# Patient Record
Sex: FEMALE | Race: WHITE | URBAN - METROPOLITAN AREA
[De-identification: names, ages, dates, MRNs, and addresses within clinical notes are randomized per-mention and may not be internally consistent; named-entity substitution may affect disease eponyms.]

---

## 2017-01-18 ENCOUNTER — RADIANT APPOINTMENT (OUTPATIENT)
Dept: MAMMOGRAPHY | Facility: CLINIC | Age: 62
End: 2017-01-18

## 2017-01-18 DIAGNOSIS — Z12.31 VISIT FOR SCREENING MAMMOGRAM: ICD-10-CM

## 2017-01-20 ENCOUNTER — TELEPHONE (OUTPATIENT)
Dept: OPHTHALMOLOGY | Facility: CLINIC | Age: 62
End: 2017-01-20

## 2017-01-20 NOTE — TELEPHONE ENCOUNTER
Pt states using trial contact lenses and only needs glasses Rx at this time  Pt active on MyChart.  Reviewed able to print from there  Pt pleased with information  Jeremy Marcano RN 2:10 PM 01/20/2017

## 2017-01-20 NOTE — TELEPHONE ENCOUNTER
----- Message from Roslyn Galarza sent at 1/20/2017  2:02 PM CST -----  Regarding: Pt calling asking for eye Prescription to be emailed to pt email on file  Contact: [phone]  Pt calling and lost prescription of glasses and contacts. Pt is asking for a copy of the script to be emailed to her e-mail on file. Pt can be reached with any questions    Pt can be reached at [phone]  Pt e-mail verified by Pt is [email or web address]     Thank You,  Roslyn    Please DO NOT send this message and/or reply back to sender.  Call Center Representatives DO NOT respond to messages.

## 2017-02-03 ENCOUNTER — TELEPHONE (OUTPATIENT)
Dept: OPTOMETRY | Facility: CLINIC | Age: 62
End: 2017-02-03

## 2017-02-03 ENCOUNTER — OFFICE VISIT (OUTPATIENT)
Dept: OPTOMETRY | Facility: CLINIC | Age: 62
End: 2017-02-03

## 2017-02-03 DIAGNOSIS — H52.4 HYPEROPIA WITH PRESBYOPIA OF BOTH EYES: Primary | ICD-10-CM

## 2017-02-03 DIAGNOSIS — H52.03 HYPEROPIA WITH PRESBYOPIA OF BOTH EYES: Primary | ICD-10-CM

## 2017-02-03 NOTE — PROGRESS NOTES
No office visit. CL order only.    Contact Lens Billing  V-Code:  - Soft Bifocal   Final Contact Lens Rx      Brand Base Curve Diameter Sphere Cylinder Axis Add Addl. Specs   Right Proclear Multifocal 8.7 14.4 +3.25   +2.50 D   Left Proclear Multifocal Toric 8.8 14.4 +3.75 -0.75 160 +2.50 N            # of units: 1 box each eye  Price per Unit: $78 right eye, $155 left eye    These are for cosmetic contact lenses.    Encounter Diagnosis   Name Primary?     Hyperopia with presbyopia of both eyes Yes        Date of last eye exam: 12/19/16

## 2017-03-07 ASSESSMENT — ANXIETY QUESTIONNAIRES
GAD7 TOTAL SCORE: 0
7. FEELING AFRAID AS IF SOMETHING AWFUL MIGHT HAPPEN: 0 = NOT AT ALL
GAD7 TOTAL SCORE: 0

## 2017-03-07 ASSESSMENT — ENCOUNTER SYMPTOMS
BRUISES/BLEEDS EASILY: 0
SWOLLEN GLANDS: 0

## 2017-03-08 ENCOUNTER — OFFICE VISIT (OUTPATIENT)
Dept: OBGYN | Facility: CLINIC | Age: 62
End: 2017-03-08
Attending: OBSTETRICS & GYNECOLOGY
Payer: COMMERCIAL

## 2017-03-08 VITALS
WEIGHT: 169.5 LBS | SYSTOLIC BLOOD PRESSURE: 122 MMHG | HEART RATE: 82 BPM | HEIGHT: 64 IN | DIASTOLIC BLOOD PRESSURE: 81 MMHG | BODY MASS INDEX: 28.94 KG/M2

## 2017-03-08 DIAGNOSIS — Z01.419 ENCOUNTER FOR GYNECOLOGICAL EXAMINATION WITHOUT ABNORMAL FINDING: ICD-10-CM

## 2017-03-08 DIAGNOSIS — B96.89 BV (BACTERIAL VAGINOSIS): Primary | ICD-10-CM

## 2017-03-08 DIAGNOSIS — N76.0 BV (BACTERIAL VAGINOSIS): Primary | ICD-10-CM

## 2017-03-08 DIAGNOSIS — E03.9 ACQUIRED HYPOTHYROIDISM: ICD-10-CM

## 2017-03-08 LAB
CLUE CELLS: NORMAL
TRICHOMONAS (WET PREP): NORMAL
YEAST (WET PREP): NORMAL

## 2017-03-08 PROCEDURE — 99212 OFFICE O/P EST SF 10 MIN: CPT | Mod: ZF

## 2017-03-08 PROCEDURE — 87210 SMEAR WET MOUNT SALINE/INK: CPT | Mod: ZF | Performed by: OBSTETRICS & GYNECOLOGY

## 2017-03-08 RX ORDER — METRONIDAZOLE 500 MG/1
500 TABLET ORAL 2 TIMES DAILY
Qty: 14 TABLET | Refills: 1 | Status: SHIPPED | OUTPATIENT
Start: 2017-03-08 | End: 2018-11-12

## 2017-03-08 RX ORDER — LEVOTHYROXINE SODIUM 75 UG/1
75 TABLET ORAL DAILY
Qty: 90 TABLET | Refills: 3 | Status: SHIPPED | OUTPATIENT
Start: 2017-03-08 | End: 2018-05-02

## 2017-03-08 ASSESSMENT — ANXIETY QUESTIONNAIRES: GAD7 TOTAL SCORE: 0

## 2017-03-08 NOTE — MR AVS SNAPSHOT
After Visit Summary   3/8/2017    Jill Fofana    MRN: [id number]           Patient Information     Date Of Birth          1955        Visit Information        Provider Department      3/8/2017 11:00 AM Roslyn Anderson MD [facility]        Today's Diagnoses     BV (bacterial vaginosis)    -  1    Acquired hypothyroidism        Encounter for gynecological examination without abnormal finding           Follow-ups after your visit        Follow-up notes from your care team     Return in 1 year (on 3/8/2018) for Preventative Health Visit.      Who to contact     Please call your clinic at [phone] to:    Ask questions about your health    Make or cancel appointments    Discuss your medicines    Learn about your test results    Speak to your doctor   If you have compliments or concerns about an experience at your clinic, or if you wish to file a complaint, please contact [facility] Patient Relations at [phone] or email us at [email or web address]         Additional Information About Your Visit        MyChart Information     IntroMapst gives you secure access to your electronic health record. If you see a primary care provider, you can also send messages to your care team and make appointments. If you have questions, please call your primary care clinic.  If you do not have a primary care provider, please call [phone] and they will assist you.      Panasas is an electronic gateway that provides easy, online access to your medical records. With Panasas, you can request a clinic appointment, read your test results, renew a prescription or communicate with your care team.     To access your existing account, please contact your [facility] or call [phone] for assistance.        Care EveryWhere ID     This is your Care EveryWhere ID. This could be used by other organizations to access  "your [place] medical records  [id number]        Your Vitals Were     Pulse Height BMI (Body Mass Index)             82 1.626 m (5' 4\") 29.09 kg/m2          Blood Pressure from Last 3 Encounters:   03/08/17 122/81   03/04/16 106/72   11/06/14 138/77    Weight from Last 3 Encounters:   03/08/17 76.9 kg (169 lb 8 oz)   03/04/16 72.6 kg (160 lb)   11/06/14 78.3 kg (172 lb 9.6 oz)              We Performed the Following     Pelvic and Breast Exam Procedure []     Wet Prep POCT          Today's Medication Changes          These changes are accurate as of: 3/8/17 11:59 PM.  If you have any questions, ask your nurse or doctor.               Start taking these medicines.        Dose/Directions    metroNIDAZOLE 500 MG tablet   Commonly known as:  FLAGYL   Used for:  BV (bacterial vaginosis)   Started by:  Roslyn Anderson MD        Dose:  500 mg   Take 1 tablet (500 mg) by mouth 2 times daily   Quantity:  14 tablet   Refills:  1         Stop taking these medicines if you haven't already. Please contact your care team if you have questions.     vitamin D 73804 UNIT capsule   Commonly known as:  ERGOCALCIFEROL   Stopped by:  Roslyn Anderson MD                Where to get your medicines      These medications were sent to [place] [address] [place] [address] [address]     Phone:  [phone]     levothyroxine 75 MCG tablet    metroNIDAZOLE 500 MG tablet                Primary Care Provider Office Phone # Fax #    Roslyn Anderson [phone]       [facility] [address] [address]        Thank you!     Thank you for choosing [facility]  for your care. Our goal is always to provide you with excellent care. Hearing back from our patients is one way we can continue to improve our services. Please take a few minutes to complete the written survey that you may receive in the mail " after your visit with us. Thank you!             Your Updated Medication List - Protect others around you: Learn how to safely use, store and throw away your medicines at www.disposemymeds.org.          This list is accurate as of: 3/8/17 11:59 PM.  Always use your most recent med list.                   Brand Name Dispense Instructions for use    albuterol 108 (90 BASE) MCG/ACT Inhaler    PROAIR HFA/PROVENTIL HFA/VENTOLIN HFA    3 Inhaler    Inhale 2 puffs into the lungs every 6 hours as needed for shortness of breath / dyspnea       erythromycin ophthalmic ointment    ROMYCIN    1 Tube    Place 0.25 inches into both eyes At Bedtime Place 0.25 inch ribbon near corners of both eyes at bedtime.       IRON CR PO      Take  by mouth. 1 tab daily ? dose       levothyroxine 75 MCG tablet    SYNTHROID/LEVOTHROID    90 tablet    Take 1 tablet (75 mcg) by mouth daily       metroNIDAZOLE 500 MG tablet    FLAGYL    14 tablet    Take 1 tablet (500 mg) by mouth 2 times daily       MULTIVITAMIN PO      Take  by mouth.       VITAMIN D (CHOLECALCIFEROL) PO      Take by mouth daily

## 2017-03-08 NOTE — LETTER
3/8/2017       RE: Jill Fofana  [address] [facility] [address]     Dear Colleague,    Thank you for referring your patient, Jill Fofana, to the [facility] at [facility]. Please see a copy of my visit note below.      Progress Note    SUBJECTIVE:  Jill Fofana is an 61 year old  , who requests a breast and pelvic exam.      Concerns today include: none    Menstrual History:  Menstrual History 11/15/2013   Reviewed Today Yes   Comments Menopause       Last    Lab Results   Component Value Date    PAP NIL 11/15/2013     History of abnormal Pap smear: NO - age 30-65 PAP every 5 years with negative HPV co-testing recommended    Last No results found for: HPV16  Last No results found for: HPV18  Last No results found for: HRHPV    Mammogram current: yes    HISTORY:  Prescription Medications as of 3/10/2017             VITAMIN D, CHOLECALCIFEROL, PO Take by mouth daily    levothyroxine (SYNTHROID/LEVOTHROID) 75 MCG tablet Take 1 tablet (75 mcg) by mouth daily    metroNIDAZOLE (FLAGYL) 500 MG tablet Take 1 tablet (500 mg) by mouth 2 times daily    erythromycin (ROMYCIN) ophthalmic ointment Place 0.25 inches into both eyes At Bedtime Place 0.25 inch ribbon near corners of both eyes at bedtime.    albuterol (PROAIR HFA, PROVENTIL HFA, VENTOLIN HFA) 108 (90 BASE) MCG/ACT inhaler Inhale 2 puffs into the lungs every 6 hours as needed for shortness of breath / dyspnea    Multiple Vitamin (MULTIVITAMIN OR) Take  by mouth.    Ferrous Sulfate (IRON CR PO) Take  by mouth. 1 tab daily ? dose        No Known Allergies  Immunization History   Administered Date(s) Administered     TDAP (BOOSTRIX AGES 10-64) 2013       Obstetric History       T0      TAB0   SAB0   E0   M0   L2      Past Medical History   Diagnosis Date     Nonsenile cataract      Past Surgical History   Procedure Laterality Date      "Thyroidectomy       Strip vein       Appendectomy       Family History   Problem Relation Age of Onset     Nystagmus No family hx of      CANCER Father      Social History     Social History     Marital status:      Spouse name: N/A     Number of children: N/A     Years of education: N/A     Occupational History      [employer]     Social History Main Topics     Smoking status: Former Smoker     Smokeless tobacco: Never Used     Alcohol use 0.5 - 1.0 oz/week     1 - 2 Standard drinks or equivalent per week      Comment: occ.     Drug use: No     Sexual activity: No     Other Topics Concern      Service No     Blood Transfusions No     Caffeine Concern No     Occupational Exposure Yes     travel     Hobby Hazards No     Sleep Concern No     Stress Concern No     Weight Concern No     Special Diet No     Back Care No     Exercise Yes     Bike Helmet Yes     Seat Belt Yes     Self-Exams Yes     Social History Narrative    How much exercise per week? 6 days a week    How much calcium per day? Supplement, Diet       How much caffeine per day? Tea (2 cup)    How much vitamin D per day? Supplement, Diet    Do you/your family wear seatbelts?  Yes    Do you/your family use safety helmets? No    Do you/your family use sunscreen? Yes    Do you/your family keep firearms in the home? No    Do you/your family have a smoke detector(s)? Yes        Do you feel safe in your home? Yes    Has anyone ever touched you in an unwanted manner? 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     Lots of leadership changes, working very hard.     Both children graduate this year.     Roslyn Anderson MD                           ROS    EXAM:  Blood pressure 122/81, pulse 82, height 1.626 m (5' 4\"), weight 76.9 kg (169 lb 8 oz). Body mass index is 29.09 kg/(m^2).  General appearance: Pleasant female in no acute distress.     BREAST EXAM:  Breast: Without visible skin changes. No dimpling or " lesions seen.   Breasts supple, non-tender with palpation, no dominant mass, nodularity, or nipple discharge noted bilaterally. Axillary nodes negative.      PELVIC EXAM:  EG/BUS: Normal genital architecture without lesions, erythema or abnormal secretions Bartholin's, Urethra, [place]'s normal   Urethral meatus: normal   Urethra: no masses, tenderness, or scarring   Bladder: no masses or tenderness   Vagina:  with creamy, white and odorless  secretions  Cervix: Nulliparous, and no lesions  Uterus: anteverted,  and small, smooth, firm, mobile w/o pain  Adnexa: Within normal limits and No masses, nodularity, tenderness  Rectum:anus normal       ASSESSMENT:  Encounter Diagnoses   Name Primary?     Acquired hypothyroidism      BV (bacterial vaginosis) Yes      61 year old Female Pelvic and Breast Exam  BV    PLAN:   Orders Placed This Encounter   Procedures     Wet Prep POCT     metronidazole  Return in one year/PRN for preventive care or problems/concerns.     Verbalized understanding and agreement with visit plan.

## 2017-03-10 NOTE — PROGRESS NOTES
Progress Note    SUBJECTIVE:  Jill Fofana is an 61 year old  , who requests a breast and pelvic exam.      Concerns today include: none    Menstrual History:  Menstrual History 11/15/2013   Reviewed Today Yes   Comments Menopause       Last    Lab Results   Component Value Date    PAP NIL 11/15/2013     History of abnormal Pap smear: NO - age 30-65 PAP every 5 years with negative HPV co-testing recommended    Last No results found for: HPV16  Last No results found for: HPV18  Last No results found for: HRHPV    Mammogram current: yes    HISTORY:  Prescription Medications as of 3/10/2017             VITAMIN D, CHOLECALCIFEROL, PO Take by mouth daily    levothyroxine (SYNTHROID/LEVOTHROID) 75 MCG tablet Take 1 tablet (75 mcg) by mouth daily    metroNIDAZOLE (FLAGYL) 500 MG tablet Take 1 tablet (500 mg) by mouth 2 times daily    erythromycin (ROMYCIN) ophthalmic ointment Place 0.25 inches into both eyes At Bedtime Place 0.25 inch ribbon near corners of both eyes at bedtime.    albuterol (PROAIR HFA, PROVENTIL HFA, VENTOLIN HFA) 108 (90 BASE) MCG/ACT inhaler Inhale 2 puffs into the lungs every 6 hours as needed for shortness of breath / dyspnea    Multiple Vitamin (MULTIVITAMIN OR) Take  by mouth.    Ferrous Sulfate (IRON CR PO) Take  by mouth. 1 tab daily ? dose        No Known Allergies  Immunization History   Administered Date(s) Administered     TDAP (BOOSTRIX AGES 10-64) 2013       Obstetric History       T0      TAB0   SAB0   E0   M0   L2      Past Medical History   Diagnosis Date     Nonsenile cataract      Past Surgical History   Procedure Laterality Date     Thyroidectomy       Strip vein       Appendectomy       Family History   Problem Relation Age of Onset     Nystagmus No family hx of      CANCER Father      Social History     Social History     Marital status:      Spouse name: N/A     Number of children: N/A     Years of education: N/A     Occupational History  "     [employer]     Social History Main Topics     Smoking status: Former Smoker     Smokeless tobacco: Never Used     Alcohol use 0.5 - 1.0 oz/week     1 - 2 Standard drinks or equivalent per week      Comment: occ.     Drug use: No     Sexual activity: No     Other Topics Concern      Service No     Blood Transfusions No     Caffeine Concern No     Occupational Exposure Yes     travel     Hobby Hazards No     Sleep Concern No     Stress Concern No     Weight Concern No     Special Diet No     Back Care No     Exercise Yes     Bike Helmet Yes     Seat Belt Yes     Self-Exams Yes     Social History Narrative    How much exercise per week? 6 days a week    How much calcium per day? Supplement, Diet       How much caffeine per day? Tea (2 cup)    How much vitamin D per day? Supplement, Diet    Do you/your family wear seatbelts?  Yes    Do you/your family use safety helmets? No    Do you/your family use sunscreen? Yes    Do you/your family keep firearms in the home? No    Do you/your family have a smoke detector(s)? Yes        Do you feel safe in your home? Yes    Has anyone ever touched you in an unwanted manner? 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     Both children graduate this year.     Roslyn Anderson MD                           ROS    EXAM:  Blood pressure 122/81, pulse 82, height 1.626 m (5' 4\"), weight 76.9 kg (169 lb 8 oz). Body mass index is 29.09 kg/(m^2).  General appearance: Pleasant female in no acute distress.     BREAST EXAM:  Breast: Without visible skin changes. No dimpling or lesions seen.   Breasts supple, non-tender with palpation, no dominant mass, nodularity, or nipple discharge noted bilaterally. Axillary nodes negative.      PELVIC EXAM:  EG/BUS: Normal genital architecture without lesions, erythema or abnormal secretions Bartholin's, Urethra, [place]'s normal   Urethral meatus: normal "   Urethra: no masses, tenderness, or scarring   Bladder: no masses or tenderness   Vagina:  with creamy, white and odorless  secretions  Cervix: Nulliparous, and no lesions  Uterus: anteverted,  and small, smooth, firm, mobile w/o pain  Adnexa: Within normal limits and No masses, nodularity, tenderness  Rectum:anus normal       ASSESSMENT:  Encounter Diagnoses   Name Primary?     Acquired hypothyroidism      BV (bacterial vaginosis) Yes      61 year old Female Pelvic and Breast Exam  BV    PLAN:   Orders Placed This Encounter   Procedures     Wet Prep POCT     metronidazole  Return in one year/PRN for preventive care or problems/concerns.     Verbalized understanding and agreement with visit plan.  Answers for HPI/ROS submitted by the patient on 3/7/2017   SUSANA 7 TOTAL SCORE: 0  Q1: Little interest or pleasure in doing things: 0=Not at all  Q2: Feeling down, depressed or hopeless: 0=Not at all  PHQ-2 Score: 0  General Symptoms: No  Skin Symptoms: No  HENT Symptoms: No  EYE SYMPTOMS: No  HEART SYMPTOMS: No  LUNG SYMPTOMS: No  INTESTINAL SYMPTOMS: No  URINARY SYMPTOMS: No  GYNECOLOGIC SYMPTOMS: No  BREAST SYMPTOMS: No  SKELETAL SYMPTOMS: No  BLOOD SYMPTOMS: Yes  NERVOUS SYSTEM SYMPTOMS: No  MENTAL HEALTH SYMPTOMS: No  Anemia: Yes  Swollen glands: No  Easy bleeding or bruising: No  Edema or swelling: No

## 2017-08-12 ENCOUNTER — HEALTH MAINTENANCE LETTER (OUTPATIENT)
Age: 62
End: 2017-08-12

## 2017-12-20 ENCOUNTER — OFFICE VISIT (OUTPATIENT)
Dept: OPTOMETRY | Facility: CLINIC | Age: 62
End: 2017-12-20
Payer: COMMERCIAL

## 2017-12-20 DIAGNOSIS — H52.203 HYPEROPIA WITH ASTIGMATISM AND PRESBYOPIA, BILATERAL: Primary | ICD-10-CM

## 2017-12-20 DIAGNOSIS — H52.03 HYPEROPIA WITH ASTIGMATISM AND PRESBYOPIA, BILATERAL: Primary | ICD-10-CM

## 2017-12-20 DIAGNOSIS — H52.4 HYPEROPIA WITH ASTIGMATISM AND PRESBYOPIA, BILATERAL: Primary | ICD-10-CM

## 2017-12-20 ASSESSMENT — REFRACTION_WEARINGRX
OS_ADD: +2.50
OS_AXIS: 070
OS_SPHERE: +2.75
OS_CYLINDER: +0.50
OD_AXIS: 140
OD_SPHERE: +2.50
OD_ADD: +2.50
OD_CYLINDER: +0.75

## 2017-12-20 ASSESSMENT — REFRACTION_MANIFEST
OS_SPHERE: +2.50
OS_CYLINDER: +1.00
OD_AXIS: 110
OD_SPHERE: +2.50
OS_AXIS: 075
OD_CYLINDER: +0.50

## 2017-12-20 ASSESSMENT — REFRACTION_CURRENTRX
OS_ADDL_SPECS: N
OS_SPHERE: +3.75
OS_CYLINDER: -0.75
OS_AXIS: 160
OD_BASECURVE: 8.7
OS_BASECURVE: 8.8
OD_ADDL_SPECS: D
OD_ADD: +2.50
OD_BRAND: PROCLEAR MULTIFOCAL
OS_ADD: +2.50
OD_SPHERE: +3.25
OD_DIAMETER: 14.4
OS_BRAND: PROCLEAR MULTIFOCAL TORIC
OS_DIAMETER: 14.4

## 2017-12-20 ASSESSMENT — TONOMETRY
OS_IOP_MMHG: 14
OS_IOP_MMHG: 14
OD_IOP_MMHG: 16
OD_IOP_MMHG: 16
IOP_METHOD: ICARE
IOP_METHOD: ICARE

## 2017-12-20 ASSESSMENT — VISUAL ACUITY
METHOD: SNELLEN - LINEAR
OD_CC: 20/20
CORRECTION_TYPE: CONTACTS
OS_CC: 20/40

## 2017-12-20 ASSESSMENT — CUP TO DISC RATIO
OS_RATIO: 0.30
OD_RATIO: 0.30

## 2017-12-20 ASSESSMENT — SLIT LAMP EXAM - LIDS: COMMENTS: 1+ MGD

## 2017-12-20 ASSESSMENT — CONF VISUAL FIELD
OS_NORMAL: 1
OD_NORMAL: 1

## 2017-12-20 ASSESSMENT — EXTERNAL EXAM - RIGHT EYE: OD_EXAM: NORMAL

## 2017-12-20 ASSESSMENT — EXTERNAL EXAM - LEFT EYE: OS_EXAM: NORMAL

## 2017-12-20 NOTE — PROGRESS NOTES
A/P  1.) Hyperopia/Presbyopia OU  -Doing well in Proclear MF lenses, no update needed today  -Updated spec Rx given, minor changes only    2.) Anatomical narrow angle OU  -Monitor, reviewed with pt    Monitor 1-2 years routine, sooner prn    Contact Lens Billing  V-Code:  - Soft Bifocal  Final Contact Lens Rx      Brand Base Curve Diameter Sphere Cylinder Axis Add Addl. Specs   Right Proclear Multifocal 8.7 14.4 +3.25   +2.50 D   Left Proclear Multifocal Toric 8.8 14.4 +3.75 -0.75 160 +2.50 N       Expiration Date:  12/20/19    Replacement:  Monthly    Wearing Schedule:  Daily wear only         # of units: 1 box right eye, 2 boxes left eye  Price per Unit: $78 right eye, $155 left eye    These are for cosmetic contact lenses.    Encounter Diagnosis   Name Primary?     Hyperopia with astigmatism and presbyopia, bilateral Yes

## 2017-12-20 NOTE — MR AVS SNAPSHOT
After Visit Summary   12/20/2017    Jill Fofana    MRN: [id number]           Patient Information     Date Of Birth          1955        Visit Information        Provider Department      12/20/2017 2:30 PM Joann Dave, [facility]        Today's Diagnoses     Hyperopia with astigmatism and presbyopia, bilateral    -  1       Follow-ups after your visit        Who to contact     Please call your clinic at [phone] to:    Ask questions about your health    Make or cancel appointments    Discuss your medicines    Learn about your test results    Speak to your doctor   If you have compliments or concerns about an experience at your clinic, or if you wish to file a complaint, please contact [facility] Patient Relations at [phone] or email us at [email or web address]         Additional Information About Your Visit        MyChart Information     Treeveot gives you secure access to your electronic health record. If you see a primary care provider, you can also send messages to your care team and make appointments. If you have questions, please call your primary care clinic.  If you do not have a primary care provider, please call [phone] and they will assist you.      Ticketmaster is an electronic gateway that provides easy, online access to your medical records. With Ticketmaster, you can request a clinic appointment, read your test results, renew a prescription or communicate with your care team.     To access your existing account, please contact your [facility] or call [phone] for assistance.        Care EveryWhere ID     This is your Care EveryWhere ID. This could be used by other organizations to access your [place] medical records  [id number]         Blood Pressure from Last 3 Encounters:   03/08/17 122/81   03/04/16 106/72   11/06/14 138/77    Weight from Last 3 Encounters:   03/08/17 76.9 kg  (169 lb 8 oz)   03/04/16 72.6 kg (160 lb)   11/06/14 78.3 kg (172 lb 9.6 oz)              We Performed the Following     REFRACTION [[id number]]        Primary Care Provider Office Phone # Fax #    Roslynteja Anderson [phone]       [address]  [facility] [address]        Equal Access to Services     ANA PAULA TRUONG : Hadii aad ku hadasho Soomaali, waaxda luqadaha, qaybta kaalmada adeegyada, waxay idiin hayaan adeeg kharash la'aan ah. So [facility] [phone].    ATENCIÓN: Si habla espsoraya, tiene a monzon disposición servicios gratuitos de asistencia lingüística. Shubhamame al [phone].    We comply with applicable federal civil rights laws and [place] laws. We do not discriminate on the basis of race, color, national origin, age, disability, sex, sexual orientation, or gender identity.            Thank you!     Thank you for choosing [facility]  for your care. Our goal is always to provide you with excellent care. Hearing back from our patients is one way we can continue to improve our services. Please take a few minutes to complete the written survey that you may receive in the mail after your visit with us. Thank you!             Your Updated Medication List - Protect others around you: Learn how to safely use, store and throw away your medicines at www.disposemymeds.org.          This list is accurate as of: 12/20/17  3:44 PM.  Always use your most recent med list.                   Brand Name Dispense Instructions for use Diagnosis    albuterol 108 (90 BASE) MCG/ACT Inhaler    PROAIR HFA/PROVENTIL HFA/VENTOLIN HFA    3 Inhaler    Inhale 2 puffs into the lungs every 6 hours as needed for shortness of breath / dyspnea    Mild intermittent asthma without complication       erythromycin ophthalmic ointment    ROMYCIN    1 Tube    Place 0.25 inches into both eyes At Bedtime Place 0.25 inch ribbon near corners of both eyes at bedtime.    Dry eyes, bilateral       IRON CR PO      Take  by mouth. 1 tab  daily ? dose        levothyroxine 75 MCG tablet    SYNTHROID/LEVOTHROID    90 tablet    Take 1 tablet (75 mcg) by mouth daily    Acquired hypothyroidism       metroNIDAZOLE 500 MG tablet    FLAGYL    14 tablet    Take 1 tablet (500 mg) by mouth 2 times daily    BV (bacterial vaginosis)       MULTIVITAMIN PO      Take  by mouth.        VITAMIN D (CHOLECALCIFEROL) PO      Take by mouth daily

## 2018-02-08 ENCOUNTER — RADIANT APPOINTMENT (OUTPATIENT)
Dept: MAMMOGRAPHY | Facility: CLINIC | Age: 63
End: 2018-02-08
Attending: OBSTETRICS & GYNECOLOGY
Payer: COMMERCIAL

## 2018-02-08 DIAGNOSIS — Z12.31 VISIT FOR SCREENING MAMMOGRAM: ICD-10-CM

## 2018-05-02 ENCOUNTER — TELEPHONE (OUTPATIENT)
Dept: OBGYN | Facility: CLINIC | Age: 63
End: 2018-05-02

## 2018-05-02 DIAGNOSIS — E03.9 ACQUIRED HYPOTHYROIDISM: ICD-10-CM

## 2018-05-02 RX ORDER — LEVOTHYROXINE SODIUM 75 UG/1
75 TABLET ORAL DAILY
Qty: 90 TABLET | Refills: 0 | Status: SHIPPED | OUTPATIENT
Start: 2018-05-02 | End: 2018-07-12

## 2018-05-02 NOTE — TELEPHONE ENCOUNTER
----- Message from Delisa Abril sent at 5/2/2018  8:50 AM CDT -----  Regarding: call back/ lab order request, pt of Dr. Anderson  Contact: [phone]  Pt requesting order for lab work so pt can get a new script of Thyroid medication, pt stated she is currently out of the medication. Pt can be reached at [phone], and it is ok to leave a detailed message. Thanks.    [facility]  Call Center    Please DO NOT send this message and/or reply back to sender.  Call Center Representatives DO NOT respond to messages.

## 2018-05-02 NOTE — TELEPHONE ENCOUNTER
Spoke to Jill and she is ok with us sending a levothyroxine refill to cover until seen in clinic as scheduled 7/2018 with Dr Anderson. She doesn't have any s/s in which she thinks her dose may need to be modified.Pt indicated understanding and agreed with plan.

## 2018-07-05 ASSESSMENT — ENCOUNTER SYMPTOMS
POSTURAL DYSPNEA: 0
SORE THROAT: 0
NECK MASS: 0
SINUS CONGESTION: 0
COUGH DISTURBING SLEEP: 0
SMELL DISTURBANCE: 0
SHORTNESS OF BREATH: 0
DYSPNEA ON EXERTION: 0
SINUS PAIN: 1
WHEEZING: 0
HOARSE VOICE: 0
COUGH: 0
TASTE DISTURBANCE: 0
TROUBLE SWALLOWING: 0
SPUTUM PRODUCTION: 0
HEMOPTYSIS: 0
SNORES LOUDLY: 0

## 2018-07-05 ASSESSMENT — ANXIETY QUESTIONNAIRES
7. FEELING AFRAID AS IF SOMETHING AWFUL MIGHT HAPPEN: NOT AT ALL
6. BECOMING EASILY ANNOYED OR IRRITABLE: NOT AT ALL
1. FEELING NERVOUS, ANXIOUS, OR ON EDGE: NOT AT ALL
4. TROUBLE RELAXING: NOT AT ALL
2. NOT BEING ABLE TO STOP OR CONTROL WORRYING: NOT AT ALL
5. BEING SO RESTLESS THAT IT IS HARD TO SIT STILL: NOT AT ALL
GAD7 TOTAL SCORE: 0
7. FEELING AFRAID AS IF SOMETHING AWFUL MIGHT HAPPEN: NOT AT ALL
3. WORRYING TOO MUCH ABOUT DIFFERENT THINGS: NOT AT ALL
GAD7 TOTAL SCORE: 0

## 2018-07-06 ENCOUNTER — APPOINTMENT (OUTPATIENT)
Dept: LAB | Facility: CLINIC | Age: 63
End: 2018-07-06
Payer: COMMERCIAL

## 2018-07-06 ENCOUNTER — OFFICE VISIT (OUTPATIENT)
Dept: OBGYN | Facility: CLINIC | Age: 63
End: 2018-07-06
Attending: OBSTETRICS & GYNECOLOGY
Payer: COMMERCIAL

## 2018-07-06 VITALS
WEIGHT: 170.4 LBS | HEIGHT: 64 IN | BODY MASS INDEX: 29.09 KG/M2 | SYSTOLIC BLOOD PRESSURE: 129 MMHG | HEART RATE: 92 BPM | DIASTOLIC BLOOD PRESSURE: 89 MMHG

## 2018-07-06 DIAGNOSIS — Z12.4 SCREENING FOR MALIGNANT NEOPLASM OF CERVIX: Primary | ICD-10-CM

## 2018-07-06 DIAGNOSIS — N90.5 ATROPHY, VULVA: ICD-10-CM

## 2018-07-06 DIAGNOSIS — J45.20 MILD INTERMITTENT ASTHMA WITHOUT COMPLICATION: ICD-10-CM

## 2018-07-06 DIAGNOSIS — Z00.00 VISIT FOR PREVENTIVE HEALTH EXAMINATION: ICD-10-CM

## 2018-07-06 LAB
ERYTHROCYTE [DISTWIDTH] IN BLOOD BY AUTOMATED COUNT: 12.9 % (ref 10–15)
FERRITIN SERPL-MCNC: 359 NG/ML (ref 8–252)
HCT VFR BLD AUTO: 43.4 % (ref 35–47)
HGB BLD-MCNC: 13.6 G/DL (ref 11.7–15.7)
MCH RBC QN AUTO: 27.9 PG (ref 26.5–33)
MCHC RBC AUTO-ENTMCNC: 31.3 G/DL (ref 31.5–36.5)
MCV RBC AUTO: 89 FL (ref 78–100)
PLATELET # BLD AUTO: 244 10E9/L (ref 150–450)
RBC # BLD AUTO: 4.87 10E12/L (ref 3.8–5.2)
TSH SERPL DL<=0.005 MIU/L-ACNC: 1.53 MU/L (ref 0.4–4)
WBC # BLD AUTO: 7.4 10E9/L (ref 4–11)

## 2018-07-06 PROCEDURE — 87624 HPV HI-RISK TYP POOLED RSLT: CPT | Performed by: OBSTETRICS & GYNECOLOGY

## 2018-07-06 PROCEDURE — 36415 COLL VENOUS BLD VENIPUNCTURE: CPT | Performed by: OBSTETRICS & GYNECOLOGY

## 2018-07-06 PROCEDURE — 84443 ASSAY THYROID STIM HORMONE: CPT | Performed by: OBSTETRICS & GYNECOLOGY

## 2018-07-06 PROCEDURE — 82306 VITAMIN D 25 HYDROXY: CPT | Performed by: OBSTETRICS & GYNECOLOGY

## 2018-07-06 PROCEDURE — 85027 COMPLETE CBC AUTOMATED: CPT | Performed by: OBSTETRICS & GYNECOLOGY

## 2018-07-06 PROCEDURE — G0463 HOSPITAL OUTPT CLINIC VISIT: HCPCS

## 2018-07-06 PROCEDURE — G0145 SCR C/V CYTO,THINLAYER,RESCR: HCPCS | Performed by: OBSTETRICS & GYNECOLOGY

## 2018-07-06 PROCEDURE — 82728 ASSAY OF FERRITIN: CPT | Performed by: OBSTETRICS & GYNECOLOGY

## 2018-07-06 RX ORDER — ALBUTEROL SULFATE 90 UG/1
2 AEROSOL, METERED RESPIRATORY (INHALATION) EVERY 6 HOURS PRN
Qty: 3 INHALER | Refills: 3 | Status: SHIPPED | OUTPATIENT
Start: 2018-07-06

## 2018-07-06 RX ORDER — ESTRADIOL 0.1 MG/G
CREAM VAGINAL
Qty: 60 G | Refills: 3 | Status: SHIPPED | OUTPATIENT
Start: 2018-07-06 | End: 2018-11-12

## 2018-07-06 ASSESSMENT — ANXIETY QUESTIONNAIRES: GAD7 TOTAL SCORE: 0

## 2018-07-06 NOTE — MR AVS SNAPSHOT
"              After Visit Summary   7/6/2018    Jill Fofana    MRN: [id number]           Patient Information     Date Of Birth          1955        Visit Information        Provider Department      7/6/2018 12:45 PM Roslyn Anderson MD [facility]        Today's Diagnoses     Screening for malignant neoplasm of cervix    -  1    Atrophy, vulva        Visit for preventive health examination           Follow-ups after your visit        Follow-up notes from your care team     Return in 1 year (on 7/6/2019) for Preventative Health Visit.      Who to contact     Please call your clinic at [phone] to:    Ask questions about your health    Make or cancel appointments    Discuss your medicines    Learn about your test results    Speak to your doctor            Additional Information About Your Visit        MyChart Information     [employer] gives you secure access to your electronic health record. If you see a primary care provider, you can also send messages to your care team and make appointments. If you have questions, please call your primary care clinic.  If you do not have a primary care provider, please call [phone] and they will assist you.      [employer] is an electronic gateway that provides easy, online access to your medical records. With [employer], you can request a clinic appointment, read your test results, renew a prescription or communicate with your care team.     To access your existing account, please contact your [facility] or call [phone] for assistance.        Care EveryWhere ID     This is your Care EveryWhere ID. This could be used by other organizations to access your [facility] medical records  [id number]        Your Vitals Were     Pulse Height BMI (Body Mass Index)             92 1.626 m (5' 4\") 29.25 kg/m2          Blood Pressure from Last 3 Encounters:   07/06/18 129/89   03/08/17 122/81   03/04/16 106/72    " Weight from Last 3 Encounters:   07/06/18 77.3 kg (170 lb 6.4 oz)   03/08/17 76.9 kg (169 lb 8 oz)   03/04/16 72.6 kg (160 lb)              We Performed the Following     25- OH-Vitamin D     CBC with Platelets     Ferritin     Obtaining, preparing and conveyance of cervical or vaginal smear to laboratory.     Pap imaged thin layer screen with HPV - recommended age 30 - 65 years (select HPV order below)     TSH with free T4 reflex          Today's Medication Changes          These changes are accurate as of 7/6/18  1:36 PM.  If you have any questions, ask your nurse or doctor.               Start taking these medicines.        Dose/Directions    estradiol 0.1 MG/GM cream   Commonly known as:  ESTRACE VAGINAL   Used for:  Atrophy, vulva   Started by:  Roslyn Anderson MD        Apply quarter sized dollop weekly to vulva and urethra.   Quantity:  60 g   Refills:  3            Where to get your medicines      These medications were sent to [place] [address] [place] [address] [address]     Phone:  [phone]     estradiol 0.1 MG/GM cream                Primary Care Provider Office Phone # Fax #    Roslyn Anderson [phone]       [address] [facility] [address]  [facility] [address]        Equal Access to Services     YO TRUONG AH: Jacqui carrerao Somunir, waaxda luqadaha, qaybta kaalmada adeegyada, dulce maria madrid. So [facility] [phone].    ATENCIÓN: Si habla español, tiene a monzon disposición servicios gratuitos de asistencia lingüística. Llame al [phone].    We comply with applicable federal civil rights laws and [place] laws. We do not discriminate on the basis of race, color, national origin, age, disability, sex, sexual orientation, or gender identity.            Thank you!     Thank you for choosing [facility]  for your care. Our goal is always to provide you with excellent care.  Hearing back from our patients is one way we can continue to improve our services. Please take a few minutes to complete the written survey that you may receive in the mail after your visit with us. Thank you!             Your Updated Medication List - Protect others around you: Learn how to safely use, store and throw away your medicines at www.disposemymeds.org.          This list is accurate as of 7/6/18  1:36 PM.  Always use your most recent med list.                   Brand Name Dispense Instructions for use Diagnosis    albuterol 108 (90 Base) MCG/ACT Inhaler    PROAIR HFA/PROVENTIL HFA/VENTOLIN HFA    3 Inhaler    Inhale 2 puffs into the lungs every 6 hours as needed for shortness of breath / dyspnea    Mild intermittent asthma without complication       erythromycin ophthalmic ointment    ROMYCIN    1 Tube    Place 0.25 inches into both eyes At Bedtime Place 0.25 inch ribbon near corners of both eyes at bedtime.    Dry eyes, bilateral       estradiol 0.1 MG/GM cream    ESTRACE VAGINAL    60 g    Apply quarter sized dollop weekly to vulva and urethra.    Atrophy, vulva       IRON CR PO      Take  by mouth. 1 tab daily ? dose        levothyroxine 75 MCG tablet    SYNTHROID/LEVOTHROID    90 tablet    Take 1 tablet (75 mcg) by mouth daily    Acquired hypothyroidism       metroNIDAZOLE 500 MG tablet    FLAGYL    14 tablet    Take 1 tablet (500 mg) by mouth 2 times daily    BV (bacterial vaginosis)       MULTIVITAMIN PO      Take  by mouth.        VITAMIN D (CHOLECALCIFEROL) PO      Take by mouth daily

## 2018-07-06 NOTE — PROGRESS NOTES
Progress Note    SUBJECTIVE:  Jill Fofana is an 62 year old, , who requests an Annual Preventive Exam.       Concerns today include: needs TSH recheck and then refills. Also stopped Fe and Vit D due to GI upset.     Menstrual History:  Menstrual History 11/15/2013   Reviewed Today Yes   Comments Menopause       Last    Lab Results   Component Value Date    PAP NIL 11/15/2013     History of abnormal Pap smear: NO - age 30-65 PAP every 5 years with negative HPV co-testing recommended    Mammogram current: yes  Last Mammogram:   Ma Screening Digital Bilateral    Result Date: 2018  Narrative: Examination: Bilateral digital screening mammography with computer aided detection History: No symptoms, routine screening. No family history of breast cancer. Comparison: Mammogram dated 2017, 2015, 2014, and 2013. BREAST DENSITY: Scattered fibroglandular densities. Findings: No significant change.     Ma Screening Digital Bilateral    Result Date: 2017  Narrative: SCREENING MAMMOGRAM, BILATERAL, DIGITAL, with CAD HISTORY: No current breast complaints. COMPARISON: 2015, 2014, 2013, 2012 BREAST DENSITY: Scattered fibroglandular densities. No significant change.     Ma Screening Digital Bilateral    Result Date: 2015  Narrative: SCREENING MAMMOGRAM, BILATERAL, DIGITAL w/CAD HISTORY: No current breast symptoms. COMPARISON:  2014, 2013, and 2012 BREAST DENSITY: Scattered fibroglandular densities. No significant change.     Ma Screening Digital Bilateral    Result Date: 12/15/2014  Narrative: SCREENING MAMMOGRAM, BILATERAL, DIGITAL w/ COMPUTER AIDED DETECTION HISTORY: No current breast concerns. COMPARISON: Screening mammograms from 2013, 2012, and 11/3/2011 BREAST DENSITY: Scattered fibroglandular densities. FINDINGS: No significant change.        Last Colonoscopy:  No results found for this or any previous  visit.      HISTORY:    Current Outpatient Prescriptions on File Prior to Visit:  albuterol (PROAIR HFA, PROVENTIL HFA, VENTOLIN HFA) 108 (90 BASE) MCG/ACT inhaler Inhale 2 puffs into the lungs every 6 hours as needed for shortness of breath / dyspnea (Patient not taking: Reported on 3/8/2017)   erythromycin (ROMYCIN) ophthalmic ointment Place 0.25 inches into both eyes At Bedtime Place 0.25 inch ribbon near corners of both eyes at bedtime. (Patient not taking: Reported on 3/8/2017)   Ferrous Sulfate (IRON CR PO) Take  by mouth. 1 tab daily ? dose   levothyroxine (SYNTHROID/LEVOTHROID) 75 MCG tablet Take 1 tablet (75 mcg) by mouth daily   metroNIDAZOLE (FLAGYL) 500 MG tablet Take 1 tablet (500 mg) by mouth 2 times daily   Multiple Vitamin (MULTIVITAMIN OR) Take  by mouth.   VITAMIN D, CHOLECALCIFEROL, PO Take by mouth daily     No current facility-administered medications on file prior to visit.   No Known Allergies  Immunization History   Administered Date(s) Administered     TDAP Vaccine (Boostrix) 2013       Obstetric History       T0      L2     SAB0   TAB0   Ectopic0   Multiple0   Live Births0      Past Medical History:   Diagnosis Date     Anemia      Nonsenile cataract      Thyroid disease      Uncomplicated asthma      Past Surgical History:   Procedure Laterality Date     APPENDECTOMY       COLONOSCOPY  ?     ORTHOPEDIC SURGERY  2016     STRIP VEIN       THYROIDECTOMY       Family History   Problem Relation Age of Onset     Cancer Father      Other Cancer Father      Retinal detachment Maternal Grandfather      2' to fall     Asthma Mother      Nystagmus No family hx of      Glaucoma No family hx of      Macular Degeneration No family hx of      Social History     Social History     Marital status:      Spouse name: N/A     Number of children: N/A     Years of education: N/A     Occupational History      [employer]     Social History Main Topics  "    Smoking status: Former Smoker     Smokeless tobacco: Never Used     Alcohol use 0.5 - 1.0 oz/week      Comment: occ.     Drug use: No     Sexual activity: No     Other Topics Concern      Service No     Blood Transfusions No     Caffeine Concern No     Occupational Exposure Yes     travel     Hobby Hazards No     Sleep Concern No     Stress Concern No     Weight Concern No     Special Diet No     Back Care No     Exercise Yes     Bike Helmet Yes     Seat Belt Yes     Self-Exams Yes     Social History Narrative    How much exercise per week? 6 days a week    How much calcium per day? Supplement, Diet       How much caffeine per day? Tea (2 cup)    How much vitamin D per day? Supplement, Diet    Do you/your family wear seatbelts?  Yes    Do you/your family use safety helmets? No    Do you/your family use sunscreen? Yes    Do you/your family keep firearms in the home? No    Do you/your family have a smoke detector(s)? Yes        Do you feel safe in your home? Yes    Has anyone ever touched you in an unwanted manner? 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     Both children graduate this year.     Roslyn Anderson MD        7/6/18    Struggling with political climate but otherwise well. Daughter  last month. Son is atty in [facility].     Roslyn Anderson                           ROS  [unfilled]  No flowsheet data found.  SUSANA-7 SCORE 3/7/2017 7/5/2018   Total Score 0 (minimal anxiety) 0 (minimal anxiety)   Total Score - 0         EXAM:  Blood pressure 129/89, pulse 92, height 1.626 m (5' 4\"), weight 77.3 kg (170 lb 6.4 oz). Body mass index is 29.25 kg/(m^2).  General - pleasant female in no acute distress.  Skin - no suspicious lesions or rashes  EENT-  PERRLA, euthyroid with out palpable nodules  Neck - supple without lymphadenopathy.  Lungs - clear to auscultation bilaterally.  Heart - regular rate and rhythm without " murmur.  Abdomen - soft, nontender, nondistended, no masses or organomegaly noted.  Musculoskeletal - no gross deformities.  Neurological - normal strength, sensation, and mental status.    Breast Exam:  Breast: Without visible skin changes. No dimpling or lesions seen.   Breasts supple, non-tender with palpation, no dominant mass, nodularity, or nipple discharge noted bilaterally. Axillary nodes negative.      Pelvic Exam:  EG/BUS: Normal genital architecture without lesions, erythema or abnormal secretions Bartholin's, Urethra, [place]'s normal   Urethral meatus: normal   Urethra: no masses, tenderness, or scarring   Bladder: no masses or tenderness   Vagina: atrophic, thin, dry with white and odorless  secretions  Cervix: no lesions and pink, moist, closed, without lesion or CMT  Uterus: anteverted,  and small, smooth, firm, mobile w/o pain  Adnexa: Within normal limits and No masses, nodularity, tenderness  Rectum:anus with external hemorrhoids      ASSESSMENT:  Encounter Diagnoses   Name Primary?     Screening for malignant neoplasm of cervix Yes     Atrophy, vulva      Visit for preventive health examination         PLAN:   Orders Placed This Encounter   Procedures     Obtaining, preparing and conveyance of cervical or vaginal smear to laboratory.     Pap imaged thin layer screen with HPV - recommended age 30 - 65 years (select HPV order below)     TSH with free T4 reflex     25- OH-Vitamin D     CBC with Platelets     Ferritin     Orders Placed This Encounter   Medications     estradiol (ESTRACE VAGINAL) 0.1 MG/GM cream     Sig: Apply quarter sized dollop weekly to vulva and urethra.     Dispense:  60 g     Refill:  3     Labs today  I will refill thyroid when labs back  Roslyn Anderson          Answers for HPI/ROS submitted by the patient on 7/5/2018   SUSANA 7 TOTAL SCORE: 0  PHQ-2 Score: 0  General Symptoms: No  Skin Symptoms: No  HENT Symptoms: Yes  EYE SYMPTOMS: No  HEART SYMPTOMS: No  LUNG SYMPTOMS:  Yes  INTESTINAL SYMPTOMS: No  URINARY SYMPTOMS: No  GYNECOLOGIC SYMPTOMS: No  BREAST SYMPTOMS: No  SKELETAL SYMPTOMS: No  BLOOD SYMPTOMS: No  NERVOUS SYSTEM SYMPTOMS: No  MENTAL HEALTH SYMPTOMS: No  Ear pain: Yes  Ear discharge: No  Hearing loss: No  Tinnitus: No  Nosebleeds: No  Congestion: No  Sinus pain: Yes  Trouble swallowing: No   Voice hoarseness: No  Mouth sores: No  Sore throat: No  Tooth pain: No  Gum tenderness: No  Bleeding gums: No  Change in taste: No  Change in sense of smell: No  Dry mouth: No  Hearing aid used: No  Neck lump: No  Cough: No  Sputum or phlegm: No  Coughing up blood: No  Difficulty breating or shortness of breath: No  Snoring: No  Wheezing: No  Difficulty breathing on exertion: No  Nighttime Cough: No  Difficulty breathing when lying flat: No  ROS reviewed today with patient.   Roslyn Anderson  July 6, 2018

## 2018-07-06 NOTE — LETTER
2018       RE: Jill Fofana  [address] [facility] [address]     Dear Colleague,    Thank you for referring your patient, Jill Fofana, to the [facility] at [facility]. Please see a copy of my visit note below.          Progress Note    SUBJECTIVE:  Jill Fofana is an 62 year old, , who requests an Annual Preventive Exam.       Concerns today include: needs TSH recheck and then refills. Also stopped Fe and Vit D due to GI upset.     Menstrual History:  Menstrual History 11/15/2013   Reviewed Today Yes   Comments Menopause       Last    Lab Results   Component Value Date    PAP NIL 11/15/2013     History of abnormal Pap smear: NO - age 30-65 PAP every 5 years with negative HPV co-testing recommended    Mammogram current: yes  Last Mammogram:   Ma Screening Digital Bilateral    Result Date: 2018  Narrative: Examination: Bilateral digital screening mammography with computer aided detection History: No symptoms, routine screening. No family history of breast cancer. Comparison: Mammogram dated 2017, 2015, 2014, and 2013. BREAST DENSITY: Scattered fibroglandular densities. Findings: No significant change.     Ma Screening Digital Bilateral    Result Date: 2017  Narrative: SCREENING MAMMOGRAM, BILATERAL, DIGITAL, with CAD HISTORY: No current breast complaints. COMPARISON: 2015, 2014, 2013, 2012 BREAST DENSITY: Scattered fibroglandular densities. No significant change.     Ma Screening Digital Bilateral    Result Date: 2015  Narrative: SCREENING MAMMOGRAM, BILATERAL, DIGITAL w/CAD HISTORY: No current breast symptoms. COMPARISON:  2014, 2013, and 2012 BREAST DENSITY: Scattered fibroglandular densities. No significant change.     Ma Screening Digital Bilateral    Result Date: 12/15/2014  Narrative: SCREENING MAMMOGRAM, BILATERAL, DIGITAL w/  COMPUTER AIDED DETECTION HISTORY: No current breast concerns. COMPARISON: Screening mammograms from 2013, 2012, and 11/3/2011 BREAST DENSITY: Scattered fibroglandular densities. FINDINGS: No significant change.        Last Colonoscopy:  No results found for this or any previous visit.      HISTORY:    Current Outpatient Prescriptions on File Prior to Visit:  albuterol (PROAIR HFA, PROVENTIL HFA, VENTOLIN HFA) 108 (90 BASE) MCG/ACT inhaler Inhale 2 puffs into the lungs every 6 hours as needed for shortness of breath / dyspnea (Patient not taking: Reported on 3/8/2017)   erythromycin (ROMYCIN) ophthalmic ointment Place 0.25 inches into both eyes At Bedtime Place 0.25 inch ribbon near corners of both eyes at bedtime. (Patient not taking: Reported on 3/8/2017)   Ferrous Sulfate (IRON CR PO) Take  by mouth. 1 tab daily ? dose   levothyroxine (SYNTHROID/LEVOTHROID) 75 MCG tablet Take 1 tablet (75 mcg) by mouth daily   metroNIDAZOLE (FLAGYL) 500 MG tablet Take 1 tablet (500 mg) by mouth 2 times daily   Multiple Vitamin (MULTIVITAMIN OR) Take  by mouth.   VITAMIN D, CHOLECALCIFEROL, PO Take by mouth daily     No current facility-administered medications on file prior to visit.   No Known Allergies  Immunization History   Administered Date(s) Administered     TDAP Vaccine (Boostrix) 2013       Obstetric History       T0      L2     SAB0   TAB0   Ectopic0   Multiple0   Live Births0      Past Medical History:   Diagnosis Date     Anemia      Nonsenile cataract      Thyroid disease      Uncomplicated asthma      Past Surgical History:   Procedure Laterality Date     APPENDECTOMY       COLONOSCOPY  ?     ORTHOPEDIC SURGERY  2016     STRIP VEIN       THYROIDECTOMY       Family History   Problem Relation Age of Onset     Cancer Father      Other Cancer Father      Retinal detachment Maternal Grandfather      2' to fall     Asthma Mother      Nystagmus No family hx of       "Glaucoma No family hx of      Macular Degeneration No family hx of      Social History     Social History     Marital status:      Spouse name: N/A     Number of children: N/A     Years of education: N/A     Occupational History      [employer]     Social History Main Topics     Smoking status: Former Smoker     Smokeless tobacco: Never Used     Alcohol use 0.5 - 1.0 oz/week      Comment: occ.     Drug use: No     Sexual activity: No     Other Topics Concern      Service No     Blood Transfusions No     Caffeine Concern No     Occupational Exposure Yes     travel     Hobby Hazards No     Sleep Concern No     Stress Concern No     Weight Concern No     Special Diet No     Back Care No     Exercise Yes     Bike Helmet Yes     Seat Belt Yes     Self-Exams Yes     Social History Narrative    How much exercise per week? 6 days a week    How much calcium per day? Supplement, Diet       How much caffeine per day? Tea (2 cup)    How much vitamin D per day? Supplement, Diet    Do you/your family wear seatbelts?  Yes    Do you/your family use safety helmets? No    Do you/your family use sunscreen? Yes    Do you/your family keep firearms in the home? No    Do you/your family have a smoke detector(s)? Yes        Do you feel safe in your home? Yes    Has anyone ever touched you in an unwanted manner? 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     Both children graduate this year.     Roslyn Anderson MD        7/6/18    Struggling with political climate but otherwise well. Daughter  last month. Son is atty in [employer].     Roslyn Anderson                           ROS  [unfilled]  No flowsheet data found.  SUSANA-7 SCORE 3/7/2017 7/5/2018   Total Score 0 (minimal anxiety) 0 (minimal anxiety)   Total Score - 0         EXAM:  Blood pressure 129/89, pulse 92, height 1.626 m (5' 4\"), weight 77.3 kg (170 lb 6.4 oz). Body mass index " is 29.25 kg/(m^2).  General - pleasant female in no acute distress.  Skin - no suspicious lesions or rashes  EENT-  PERRLA, euthyroid with out palpable nodules  Neck - supple without lymphadenopathy.  Lungs - clear to auscultation bilaterally.  Heart - regular rate and rhythm without murmur.  Abdomen - soft, nontender, nondistended, no masses or organomegaly noted.  Musculoskeletal - no gross deformities.  Neurological - normal strength, sensation, and mental status.    Breast Exam:  Breast: Without visible skin changes. No dimpling or lesions seen.   Breasts supple, non-tender with palpation, no dominant mass, nodularity, or nipple discharge noted bilaterally. Axillary nodes negative.      Pelvic Exam:  EG/BUS: Normal genital architecture without lesions, erythema or abnormal secretions Bartholin's, Urethra, [place]'s normal   Urethral meatus: normal   Urethra: no masses, tenderness, or scarring   Bladder: no masses or tenderness   Vagina: atrophic, thin, dry with white and odorless  secretions  Cervix: no lesions and pink, moist, closed, without lesion or CMT  Uterus: anteverted,  and small, smooth, firm, mobile w/o pain  Adnexa: Within normal limits and No masses, nodularity, tenderness  Rectum:anus with external hemorrhoids      ASSESSMENT:  Encounter Diagnoses   Name Primary?     Screening for malignant neoplasm of cervix Yes     Atrophy, vulva      Visit for preventive health examination         PLAN:   Orders Placed This Encounter   Procedures     Obtaining, preparing and conveyance of cervical or vaginal smear to laboratory.     Pap imaged thin layer screen with HPV - recommended age 30 - 65 years (select HPV order below)     TSH with free T4 reflex     25- OH-Vitamin D     CBC with Platelets     Ferritin     Orders Placed This Encounter   Medications     estradiol (ESTRACE VAGINAL) 0.1 MG/GM cream     Sig: Apply quarter sized dollop weekly to vulva and urethra.     Dispense:  60 g     Refill:  3     Labs  today  I will refill thyroid when labs back  Roslyn Anderson

## 2018-07-08 LAB — DEPRECATED CALCIDIOL+CALCIFEROL SERPL-MC: 22 UG/L (ref 20–75)

## 2018-07-10 LAB
COPATH REPORT: NORMAL
PAP: NORMAL

## 2018-07-12 DIAGNOSIS — E03.9 ACQUIRED HYPOTHYROIDISM: ICD-10-CM

## 2018-07-12 LAB
FINAL DIAGNOSIS: NORMAL
HPV HR 12 DNA CVX QL NAA+PROBE: NEGATIVE
HPV16 DNA SPEC QL NAA+PROBE: NEGATIVE
HPV18 DNA SPEC QL NAA+PROBE: NEGATIVE
SPECIMEN DESCRIPTION: NORMAL
SPECIMEN SOURCE CVX/VAG CYTO: NORMAL

## 2018-07-12 RX ORDER — LEVOTHYROXINE SODIUM 75 UG/1
75 TABLET ORAL DAILY
Qty: 90 TABLET | Refills: 3 | Status: SHIPPED | OUTPATIENT
Start: 2018-07-12 | End: 2019-08-21

## 2018-11-12 ENCOUNTER — OFFICE VISIT (OUTPATIENT)
Dept: FAMILY MEDICINE | Facility: CLINIC | Age: 63
End: 2018-11-12
Payer: COMMERCIAL

## 2018-11-12 VITALS
RESPIRATION RATE: 16 BRPM | TEMPERATURE: 97.5 F | BODY MASS INDEX: 31.65 KG/M2 | SYSTOLIC BLOOD PRESSURE: 133 MMHG | DIASTOLIC BLOOD PRESSURE: 84 MMHG | HEART RATE: 66 BPM | HEIGHT: 62 IN | OXYGEN SATURATION: 97 % | WEIGHT: 172 LBS

## 2018-11-12 DIAGNOSIS — M54.50 ACUTE LEFT-SIDED LOW BACK PAIN WITHOUT SCIATICA: Primary | ICD-10-CM

## 2018-11-12 RX ORDER — CYCLOBENZAPRINE HCL 10 MG
5-10 TABLET ORAL 3 TIMES DAILY PRN
Qty: 30 TABLET | Refills: 1 | Status: SHIPPED | OUTPATIENT
Start: 2018-11-12

## 2018-11-12 RX ORDER — ACETAMINOPHEN 325 MG/1
650 TABLET ORAL EVERY 4 HOURS PRN
Qty: 100 TABLET | Refills: 0 | Status: SHIPPED | OUTPATIENT
Start: 2018-11-12

## 2018-11-12 RX ORDER — NAPROXEN 500 MG/1
500 TABLET ORAL 2 TIMES DAILY PRN
Qty: 30 TABLET | Refills: 1 | Status: SHIPPED | OUTPATIENT
Start: 2018-11-12

## 2018-11-12 ASSESSMENT — ENCOUNTER SYMPTOMS
BLOOD IN STOOL: 0
FREQUENCY: 0
CONSTITUTIONAL NEGATIVE: 1
WOUND: 1
HEMATURIA: 0
DYSURIA: 0
DIARRHEA: 0
SHORTNESS OF BREATH: 0
NUMBNESS: 0
WEAKNESS: 0
COUGH: 0
BACK PAIN: 1
CONSTIPATION: 0

## 2018-11-12 ASSESSMENT — ANXIETY QUESTIONNAIRES
IF YOU CHECKED OFF ANY PROBLEMS ON THIS QUESTIONNAIRE, HOW DIFFICULT HAVE THESE PROBLEMS MADE IT FOR YOU TO DO YOUR WORK, TAKE CARE OF THINGS AT HOME, OR GET ALONG WITH OTHER PEOPLE: NOT DIFFICULT AT ALL
3. WORRYING TOO MUCH ABOUT DIFFERENT THINGS: NOT AT ALL
5. BEING SO RESTLESS THAT IT IS HARD TO SIT STILL: NOT AT ALL
1. FEELING NERVOUS, ANXIOUS, OR ON EDGE: NOT AT ALL
7. FEELING AFRAID AS IF SOMETHING AWFUL MIGHT HAPPEN: NOT AT ALL
2. NOT BEING ABLE TO STOP OR CONTROL WORRYING: NOT AT ALL
GAD7 TOTAL SCORE: 0
6. BECOMING EASILY ANNOYED OR IRRITABLE: NOT AT ALL

## 2018-11-12 ASSESSMENT — PATIENT HEALTH QUESTIONNAIRE - PHQ9: 5. POOR APPETITE OR OVEREATING: NOT AT ALL

## 2018-11-12 NOTE — PROGRESS NOTES
HPI       Jill Fofana is a 63 year old  who presents for   Chief Complaint   Patient presents with     Back Pain     Pt. presents to the clinic today with low back pain after stretching with bilateral arms overhead. X 3 days ago. worse today. Pain in Left lower quadrant pain.        Back Pain      Duration: Started 2 days ago while stretching to get ready for exercise. Exercises regularly doing yoga, swimming and walking.        Specific cause: turning/bending    Description:   Location of pain: low back left and waist left  Character of pain: sharp  Pain radiation:starts on left lower back and radiates to front   New numbness or weakness in legs, not attributed to pain:  No   Any new bowel or bladder incontinence?No     Intensity: Currently 9/10    History:   Pain interferes with job/home/school:  YES has to stand, hard to complete ADLs. Did attend work today.  History of back problems: no prior back problems  Any previous MRI or X-rays: None  Sees a specialist for back pain:  No  Therapies tried without relief: cold and heat    Alleviating factors:   Improved by: NSAIDs      Precipitating factors:  Worsened by: Sitting and Lying Flat      Accompanying Signs & Symptoms:  Risk of Fracture: No   Risk of Cauda Equina:No   Risk of Infection:  No   Risk of Cancer:  No         Problem, Medication and Allergy Lists were reviewed and updated if needed..    Patient is a new patient to this clinic and so  I reviewed/updated the Past Medical History, the Family History and the Social History .         Review of Systems:   Review of Systems   Constitutional: Negative.    Respiratory: Negative for cough and shortness of breath.    Cardiovascular: Negative for chest pain and leg swelling.   Gastrointestinal: Negative for blood in stool, constipation and diarrhea.   Genitourinary: Negative for dysuria, frequency, hematuria and urgency.   Musculoskeletal: Positive for back pain. Negative for gait problem.  "  Skin: Positive for wound.   Neurological: Negative for weakness and numbness.            Physical Exam:     Vitals:    11/12/18 1451   BP: 133/84   BP Location: Left arm   Patient Position: Chair   Cuff Size: Adult Regular   Pulse: 66   Resp: 16   Temp: 97.5  F (36.4  C)   TempSrc: Oral   SpO2: 97%   Weight: 172 lb (78 kg)   Height: 5' 2.4\" (158.5 cm)     Body mass index is 31.06 kg/(m^2).  Vitals were reviewed and were normal     Physical Exam   Constitutional: She is oriented to person, place, and time. She appears well-developed and well-nourished.   HENT:   Head: Normocephalic and atraumatic.   Eyes: Conjunctivae are normal.   Neck: Normal range of motion. Neck supple.   Cardiovascular: Normal rate, regular rhythm and normal heart sounds.  Exam reveals no gallop and no friction rub.    No murmur heard.  Pulmonary/Chest: Effort normal and breath sounds normal. No respiratory distress. She has no wheezes. She has no rales.   Musculoskeletal:   Forward bend normal. Unwilling to twist or side bend due to anticipated pain. Able to straight leg raise bilaterally. Left leg raise and Carmella test elicited pain in left lower back.    Neurological: She is alert and oriented to person, place, and time.   Skin: Skin is warm and dry.   Psychiatric: She has a normal mood and affect. Her behavior is normal.         Results:   No testing ordered today    Assessment and Plan        1. Acute left-sided low back pain without sciatica  -Likely muscular given symptoms. No red flags for spinal nerve compression noted.    - naproxen (NAPROSYN) 500 MG tablet; Take 1 tablet (500 mg) by mouth 2 times daily as needed for moderate pain  Dispense: 30 tablet; Refill: 1  - Menthol, Topical Analgesic, (BIOFREEZE) 4 % GEL; Externally apply topically 3 times daily as needed (Pain)  Dispense: 1 Tube; Refill: 3  - acetaminophen (TYLENOL) 325 MG tablet; Take 2 tablets (650 mg) by mouth every 4 hours as needed for mild pain  Dispense: 100 tablet; " Refill: 0  - cyclobenzaprine (FLEXERIL) 10 MG tablet; Take 0.5-1 tablets (5-10 mg) by mouth 3 times daily as needed for muscle spasms  Dispense: 30 tablet; Refill: 1  - PHYSICAL THERAPY REFERRAL; Future       Medications Discontinued During This Encounter   Medication Reason     erythromycin (ROMYCIN) ophthalmic ointment Stopped by Patient     estradiol (ESTRACE VAGINAL) 0.1 MG/GM cream Therapy completed     VITAMIN D, CHOLECALCIFEROL, PO Therapy completed     Multiple Vitamin (MULTIVITAMIN OR) Therapy completed     metroNIDAZOLE (FLAGYL) 500 MG tablet Therapy completed     Ferrous Sulfate (IRON CR PO) Therapy completed       Options for treatment and follow-up care were reviewed with the patient. Jill Fofana  engaged in the decision making process and verbalized understanding of the options discussed and agreed with the final plan.    EUGENE Shaffer CNP

## 2018-11-12 NOTE — NURSING NOTE
"63 year old  Chief Complaint   Patient presents with     Back Pain     Pt. presents to the clinic today with low back pain after stretching with bilateral arms overhead. X 3 days ago. worse today. Pain in Left lower quadrant pain.        Blood pressure 133/84, pulse 66, temperature 97.5  F (36.4  C), temperature source Oral, resp. rate 16, height 5' 2.4\" (158.5 cm), weight 172 lb (78 kg), SpO2 97 %. Body mass index is 31.06 kg/(m^2).  BP completed using cuff size:    Patient Active Problem List   Diagnosis     Hypothyroidism     Presbyopia     Hypermetropia     Senile nuclear sclerosis     Encounter for routine gynecological examination     Menopause present -- age       Wt Readings from Last 2 Encounters:   11/12/18 172 lb (78 kg)   07/06/18 170 lb 6.4 oz (77.3 kg)     BP Readings from Last 3 Encounters:   11/12/18 133/84   07/06/18 129/89   03/08/17 122/81       Allergies   Allergen Reactions     Mushrooms [Mushroom]      Wool Fiber        Current Outpatient Prescriptions   Medication     albuterol (PROAIR HFA/PROVENTIL HFA/VENTOLIN HFA) 108 (90 Base) MCG/ACT Inhaler     levothyroxine (SYNTHROID/LEVOTHROID) 75 MCG tablet     erythromycin (ROMYCIN) ophthalmic ointment     estradiol (ESTRACE VAGINAL) 0.1 MG/GM cream     Ferrous Sulfate (IRON CR PO)     metroNIDAZOLE (FLAGYL) 500 MG tablet     Multiple Vitamin (MULTIVITAMIN OR)     VITAMIN D, CHOLECALCIFEROL, PO     No current facility-administered medications for this visit.        Social History   Substance Use Topics     Smoking status: Former Smoker     Smokeless tobacco: Never Used     Alcohol use 0.5 - 1.0 oz/week      Comment: occ.         Honoring Choices - Health Care Directive Guide offered to patient at time of visit.    Health Maintenance Due   Topic Date Due     ASTHMA ACTION PLAN Q1 YR  07/20/1962     ASTHMA CONTROL TEST Q6 MOS  07/20/1962     HIV SCREEN (SYSTEM ASSIGNED)  07/20/1975     HEPATITIS C SCREENING  07/20/1975     COLONOSCOPY Q5 YR  " 03/12/2012     ADVANCE DIRECTIVE PLANNING Q5 YRS  07/20/2012     INFLUENZA VACCINE (1) 09/01/2018       Immunization History   Administered Date(s) Administered     TDAP Vaccine (Boostrix) 05/08/2013       Lab Results   Component Value Date    PAP NIL 07/06/2018         Recent Labs   Lab Test  07/06/18   1404  03/04/16   1208  12/18/14   1431   06/17/13   1337   LDL   --   74   --    --   97   HDL   --   48*  53   --   46*   TRIG   --   135   --    --   155*   CR   --   0.71   --    --   0.71   GFRESTIMATED   --   85   --    --   85   GFRESTBLACK   --   >90   GFR Calc     --    --   >90   POTASSIUM   --   3.8   --    --   3.6   TSH  1.53  0.73  1.38   < >  0.37*    < > = values in this interval not displayed.       PHQ-2 ( 1999 [employer]) 11/12/2018 7/5/2018   Q1: Little interest or pleasure in doing things 0 0   Q2: Feeling down, depressed or hopeless 0 0   PHQ-2 Score 0 0   Q1: Little interest or pleasure in doing things - Not at all   Q2: Feeling down, depressed or hopeless - Not at all   PHQ-2 Score - 0       No flowsheet data found.    SUSANA-7 SCORE 3/7/2017 7/5/2018 11/12/2018   Total Score 0 (minimal anxiety) 0 (minimal anxiety) -   Total Score - 0 0       No flowsheet data found.    Mally Peoples, VIVIENNE  November 12, 2018 2:54 PM

## 2018-11-12 NOTE — PATIENT INSTRUCTIONS
Your pain is most likely musculoskeletal in nature. You can start taking all of these medications today and together as needed.     Follow up in 1 month.     If anything gets worse, your symptoms change PLEASE come in sooner.     Naproxen at least twice daily, please take the flexeril at bedtime to see how you react to it as it may make you drowsy.       Cyclobenzaprine tablets  Brand Names: Fexmid, Flexeril  What is this medicine?  CYCLOBENZAPRINE (sye kloe ROBERT za preen) is a muscle relaxer. It is used to treat muscle pain, spasms, and stiffness.  How should I use this medicine?  Take this medicine by mouth with a glass of water. Follow the directions on the prescription label. If this medicine upsets your stomach, take it with food or milk. Take your medicine at regular intervals. Do not take it more often than directed.  Talk to your pediatrician regarding the use of this medicine in children. Special care may be needed.  What side effects may I notice from receiving this medicine?  Side effects that you should report to your doctor or health care professional as soon as possible:    allergic reactions like skin rash, itching or hives, swelling of the face, lips, or tongue    breathing problems    chest pain    fast, irregular heartbeat    hallucinations    seizures    unusually weak or tired  Side effects that usually do not require medical attention (report to your doctor or health care professional if they continue or are bothersome):    headache    nausea, vomiting  What may interact with this medicine?  Do not take this medicine with any of the following medications:    certain medicines for fungal infections like fluconazole, itraconazole, ketoconazole, posaconazole, voriconazole    cisapride    dofetilide    dronedarone    halofantrine    levomethadyl    MAOIs like Carbex, Eldepryl, Marplan, Nardil, and Parnate    narcotic medicines for cough    pimozide    thioridazine    ziprasidone  This medicine may  also interact with the following medications:    alcohol    antihistamines for allergy, cough and cold    certain medicines for anxiety or sleep    certain medicines for cancer    certain medicines for depression like amitriptyline, fluoxetine, sertraline    certain medicines for infection like alfuzosin, chloroquine, clarithromycin, levofloxacin, mefloquine, pentamidine, troleandomycin    certain medicines for irregular heart beat    certain medicines for seizures like phenobarbital, primidone    contrast dyes    general anesthetics like halothane, isoflurane, methoxyflurane, propofol    local anesthetics like lidocaine, pramoxine, tetracaine    medicines that relax muscles for surgery    narcotic medicines for pain    other medicines that prolong the QT interval (cause an abnormal heart rhythm)    phenothiazines like chlorpromazine, mesoridazine, prochlorperazine  What if I miss a dose?  If you miss a dose, take it as soon as you can. If it is almost time for your next dose, take only that dose. Do not take double or extra doses.  Where should I keep my medicine?  Keep out of the reach of children.  Store at room temperature between 15 and 30 degrees C (59 and 86 degrees F). Keep container tightly closed. Throw away any unused medicine after the expiration date.  What should I tell my health care provider before I take this medicine?  They need to know if you have any of these conditions:    heart disease, irregular heartbeat, or previous heart attack    liver disease    thyroid problem    an unusual or allergic reaction to cyclobenzaprine, tricyclic antidepressants, lactose, other medicines, foods, dyes, or preservatives    pregnant or trying to get pregnant    breast-feeding  What should I watch for while using this medicine?  Tell your doctor or health care professional if your symptoms do not start to get better or if they get worse.  You may get drowsy or dizzy. Do not drive, use machinery, or do anything  that needs mental alertness until you know how this medicine affects you. Do not stand or sit up quickly, especially if you are an older patient. This reduces the risk of dizzy or fainting spells. Alcohol may interfere with the effect of this medicine. Avoid alcoholic drinks.  If you are taking another medicine that also causes drowsiness, you may have more side effects. Give your health care provider a list of all medicines you use. Your doctor will tell you how much medicine to take. Do not take more medicine than directed. Call emergency for help if you have problems breathing or unusual sleepiness.  Your mouth may get dry. Chewing sugarless gum or sucking hard candy, and drinking plenty of water may help. Contact your doctor if the problem does not go away or is severe.  NOTE:This sheet is a summary. It may not cover all possible information. If you have questions about this medicine, talk to your doctor, pharmacist, or health care provider. Copyright  2018 [employer]        Patient Education    Naproxen Gastro-resistant tablet    Naproxen Oral suspension    Naproxen Oral tablet    Naproxen Oral tablet, biphasic release    Naproxen Oral tablet, extended-release, Naproxen Oral tablet, extended-release    Naproxen Sodium Oral capsule, liquid filled    Naproxen Sodium Oral tablet    Naproxen Sodium Oral tablet, extended-release  Naproxen Oral tablet  What is this medicine?  NAPROXEN (na PROX en) is a non-steroidal anti-inflammatory drug (NSAID). It is used to reduce swelling and to treat pain. This medicine may be used for dental pain, headache, or painful monthly periods. It is also used for painful joint and muscular problems such as arthritis, tendinitis, bursitis, and gout.  This medicine may be used for other purposes; ask your health care provider or pharmacist if you have questions.  What should I tell my health care provider before I take this medicine?  They need to know if you have any of these  conditions:    asthma    cigarette smoker    drink more than 3 alcohol containing drinks a day    heart disease or circulation problems such as heart failure or leg edema (fluid retention)    high blood pressure    kidney disease    liver disease    stomach bleeding or ulcers    an unusual or allergic reaction to naproxen, aspirin, other NSAIDs, other medicines, foods, dyes, or preservatives    pregnant or trying to get pregnant    breast-feeding  How should I use this medicine?  Take this medicine by mouth with a glass of water. Follow the directions on the prescription label. Take it with food if your stomach gets upset. Try to not lie down for at least 10 minutes after you take it. Take your medicine at regular intervals. Do not take your medicine more often than directed. Long-term, continuous use may increase the risk of heart attack or stroke.  A special MedGuide will be given to you by the pharmacist with each prescription and refill. Be sure to read this information carefully each time.  Talk to your pediatrician regarding the use of this medicine in children. Special care may be needed.  Overdosage: If you think you have taken too much of this medicine contact a poison control center or emergency room at once.  NOTE: This medicine is only for you. Do not share this medicine with others.  What if I miss a dose?  If you miss a dose, take it as soon as you can. If it is almost time for your next dose, take only that dose. Do not take double or extra doses.  What may interact with this medicine?    alcohol    aspirin    cidofovir    diuretics    lithium    methotrexate    other drugs for inflammation like ketorolac or prednisone    pemetrexed    probenecid    warfarin  This list may not describe all possible interactions. Give your health care provider a list of all the medicines, herbs, non-prescription drugs, or dietary supplements you use. Also tell them if you smoke, drink alcohol, or use illegal drugs.  Some items may interact with your medicine.  What should I watch for while using this medicine?  Tell your doctor or health care professional if your pain does not get better. Talk to your doctor before taking another medicine for pain. Do not treat yourself.  This medicine does not prevent heart attack or stroke. In fact, this medicine may increase the chance of a heart attack or stroke. The chance may increase with longer use of this medicine and in people who have heart disease. If you take aspirin to prevent heart attack or stroke, talk with your doctor or health care professional.  Do not take other medicines that contain aspirin, ibuprofen, or naproxen with this medicine. Side effects such as stomach upset, nausea, or ulcers may be more likely to occur. Many medicines available without a prescription should not be taken with this medicine.  This medicine can cause ulcers and bleeding in the stomach and intestines at any time during treatment. Do not smoke cigarettes or drink alcohol. These increase irritation to your stomach and can make it more susceptible to damage from this medicine. Ulcers and bleeding can happen without warning symptoms and can cause death.  You may get drowsy or dizzy. Do not drive, use machinery, or do anything that needs mental alertness until you know how this medicine affects you. Do not stand or sit up quickly, especially if you are an older patient. This reduces the risk of dizzy or fainting spells.  This medicine can cause you to bleed more easily. Try to avoid damage to your teeth and gums when you brush or floss your teeth.  What side effects may I notice from receiving this medicine?  Side effects that you should report to your doctor or health care professional as soon as possible:    black or bloody stools, blood in the urine or vomit    blurred vision    chest pain    difficulty breathing or wheezing    nausea or vomiting    severe stomach pain    skin rash, skin redness,  blistering or peeling skin, hives, or itching    slurred speech or weakness on one side of the body    swelling of eyelids, throat, lips    unexplained weight gain or swelling    unusually weak or tired    yellowing of eyes or skin  Side effects that usually do not require medical attention (report to your doctor or health care professional if they continue or are bothersome):    constipation    headache    heartburn  This list may not describe all possible side effects. Call your doctor for medical advice about side effects. You may report side effects to FDA at 7-272-FDA-0904.  Where should I keep my medicine?  Keep out of the reach of children.  Store at room temperature between 15 and 30 degrees C (59 and 86 degrees F). Keep container tightly closed. Throw away any unused medicine after the expiration date.  NOTE:This sheet is a summary. It may not cover all possible information. If you have questions about this medicine, talk to your doctor, pharmacist, or health care provider. Copyright  2016 [employer]

## 2018-11-13 ASSESSMENT — ANXIETY QUESTIONNAIRES: GAD7 TOTAL SCORE: 0

## 2018-11-20 ENCOUNTER — TRANSFERRED RECORDS (OUTPATIENT)
Dept: HEALTH INFORMATION MANAGEMENT | Facility: CLINIC | Age: 63
End: 2018-11-20

## 2018-11-20 ENCOUNTER — THERAPY VISIT (OUTPATIENT)
Dept: PHYSICAL THERAPY | Facility: CLINIC | Age: 63
End: 2018-11-20
Payer: COMMERCIAL

## 2018-11-20 DIAGNOSIS — M54.50 ACUTE LEFT-SIDED LOW BACK PAIN WITHOUT SCIATICA: ICD-10-CM

## 2018-11-20 DIAGNOSIS — M53.3 SACROILIAC JOINT PAIN: Primary | ICD-10-CM

## 2018-11-20 PROCEDURE — 97140 MANUAL THERAPY 1/> REGIONS: CPT | Mod: GP | Performed by: PHYSICAL THERAPIST

## 2018-11-20 PROCEDURE — 97161 PT EVAL LOW COMPLEX 20 MIN: CPT | Mod: GP | Performed by: PHYSICAL THERAPIST

## 2018-11-20 PROCEDURE — 97110 THERAPEUTIC EXERCISES: CPT | Mod: GP | Performed by: PHYSICAL THERAPIST

## 2018-11-20 NOTE — MR AVS SNAPSHOT
After Visit Summary   11/20/2018    Jill Fofana    MRN: [id number]           Patient Information     Date Of Birth          1955        Visit Information        Provider Department      11/20/2018 1:10 PM Joyce High PT [place] [facility]        Today's Diagnoses     Sacroiliac joint pain    -  1    Acute left-sided low back pain without sciatica           Follow-ups after your visit        Your next 10 appointments already scheduled     Nov 27, 2018  1:30 PM CST   [facility] Spine with Vick George PT   [facility] - [facility] ([facility])    [address] [place] [address]  [address]  [facility] [place] [address] [phone]              Who to contact     If you have questions or need follow up information about today's clinic visit or your schedule please contact [facility] PHYSICAL THERAP[facility] directly at [phone].  Normal or non-critical lab and imaging results will be communicated to you by Besp[employer], letter or phone within 4 business days after the clinic has received the results. If you do not hear from us within 7 days, please contact the clinic through SpokenLayer or phone. If you have a critical or abnormal lab result, we will notify you by phone as soon as possible.  Submit refill requests through SpokenLayer or call your pharmacy and they will forward the refill request to us. Please allow 3 business days for your refill to be completed.          Additional Information About Your Visit        [employer] Information     SpokenLayer gives you secure access to your electronic health record. If you see a primary care provider, you can also send messages to your care team and make appointments. If you have questions, please call your primary care clinic.  If you do not have a primary care provider, please call [phone] and they will assist you.        Care EveryWhere  ID     This is your Care EveryWhere ID. This could be used by other organizations to access your [place] medical records  [id number]         Blood Pressure from Last 3 Encounters:   11/12/18 133/84   07/06/18 129/89   03/08/17 122/81    Weight from Last 3 Encounters:   11/12/18 78 kg (172 lb)   07/06/18 77.3 kg (170 lb 6.4 oz)   03/08/17 76.9 kg (169 lb 8 oz)              We Performed the Following     HC PT EVAL, LOW COMPLEXITY     GET[facility] INITIAL EVAL REPORT     MANUAL THER TECH,1+REGIONS,EA 15 MIN     PHYSICAL THERAPY REFERRAL     THERAPEUTIC EXERCISES        Primary Care Provider Office Phone # Fax #    Roslyn Anderson [phone]       [address] [facility] [address]  [facility] [address]        Equal Access to Services     ANA PAULA TRUONG : Hadii aad ku hadasho Soomaali, waaxda luqadaha, qaybta kaalmada adeegyada, waxkyler cash haydebbie rai . So [facility] [phone].    ATENCIÓN: Si habla español, tiene a monzon disposición servicios gratuitos de asistencia lingüística. Manuel al [phone].    We comply with applicable federal civil rights laws and [place] laws. We do not discriminate on the basis of race, color, national origin, age, disability, sex, sexual orientation, or gender identity.            Thank you!     Thank you for choosing [facility]ERAP[facility]  for your care. Our goal is always to provide you with excellent care. Hearing back from our patients is one way we can continue to improve our services. Please take a few minutes to complete the written survey that you may receive in the mail after your visit with us. Thank you!             Your Updated Medication List - Protect others around you: Learn how to safely use, store and throw away your medicines at www.disposemymeds.org.          This list is accurate as of 11/20/18  2:19 PM.  Always use your most recent med list.                   Brand Name Dispense Instructions for use Diagnosis     acetaminophen 325 MG tablet    TYLENOL    100 tablet    Take 2 tablets (650 mg) by mouth every 4 hours as needed for mild pain    Acute left-sided low back pain without sciatica       albuterol 108 (90 Base) MCG/ACT inhaler    PROAIR HFA/PROVENTIL HFA/VENTOLIN HFA    3 Inhaler    Inhale 2 puffs into the lungs every 6 hours as needed for shortness of breath / dyspnea    Mild intermittent asthma without complication       cyclobenzaprine 10 MG tablet    FLEXERIL    30 tablet    Take 0.5-1 tablets (5-10 mg) by mouth 3 times daily as needed for muscle spasms    Acute left-sided low back pain without sciatica       levothyroxine 75 MCG tablet    SYNTHROID/LEVOTHROID    90 tablet    Take 1 tablet (75 mcg) by mouth daily    Acquired hypothyroidism       Menthol (Topical Analgesic) 4 % Gel    BIOFREEZE    1 Tube    Externally apply topically 3 times daily as needed (Pain)    Acute left-sided low back pain without sciatica       naproxen 500 MG tablet    NAPROSYN    30 tablet    Take 1 tablet (500 mg) by mouth 2 times daily as needed for moderate pain    Acute left-sided low back pain without sciatica

## 2018-11-20 NOTE — LETTER
[facility]  [address] [place] [address]  [address]  [facility] [id number] [phone]    2018    Re: Jill Fofana   :   1955  MRN:  [id number]   REFERRING PHYSICIAN:   Dena Stafford    [facility]    Date of Initial Evaluation:  18   Visits:  Rxs Used: 1  Reason for Referral:     Acute left-sided low back pain without sciatica  Sacroiliac joint pain    EVALUATION SUMMARY    [facility]    Subjective:    Patient is a 63 year old female presenting with rehab back hpi.   Jill Fofana is a 63 year old female with a lumbar condition.  Condition occurred with:  Twisting.  Condition occurred: during recreation/sport.  This is a new condition  MD order 18. Jill was stretching while doing yoga and bruised her back. She had increased pain after 10 min and she had increased pain the following day and pretty much standing at work. She met with her PCP and was given muscle relaxants. She was then referred to PT for further management. .    Patient reports pain:  Lumbar spine left and lower lumbar spine.  Radiates to: left groin and lower abdomen.  Pain is described as aching and is intermittent and reported as 2/10 and 4/10.   Pain is worse during the day.  Symptoms are exacerbated by twisting (transfers) and relieved by NSAID's, ice and muscle relaxants.  Since onset symptoms are gradually improving.        General health as reported by patient is excellent.  Pertinent medical history includes:  Asthma and thyroid problems.  Medical allergies: yes (wool, mushroom).  Other surgeries include:  Other and orthopedic surgery (right knee, thyroid).  Current medications:  Thyroid medication.  Current occupation is   Primary job tasks include:  Prolonged sitting and prolonged standing.                      Re:  Jill Fofana   :   1955                            Objective:       Lumbar/SI Evaluation  ROM:    AROM Lumbar:   Flexion:            WFL no pain  Ext:                    WFL no pain   Side Bend:        Left:  WFL with end range discomfort    Right:  WFL with end range discomfort  Rotation:           Left:  WFL with end range discomfort    Right:  WFL  no pain  Side Glide:        Left:     Right:   Lumbar Myotomes:  normal  Lumbar DTR's:  normal  Cord Signs:  normal  Lumbar Dermtomes:  normal  Neural Tension/Mobility:  Lumbar:  Normal    Lumbar Palpation:    Tenderness present at Right: Erector Spinae; Piriformis and PSIS  SI joint/Sacrum:      Left positive at:    Forward bend standing; Forward bend seated; Squish and Sacral thrust  Sacral conclusion left:  Locked       Assessment/Plan:      Patient is a 63 year old female with sacral complaints.    Patient has the following significant findings with corresponding treatment plan.                Diagnosis 1:  Left SI joint strain  Pain -  hot/cold therapy, US, electric stimulation, manual therapy, STS, splint/taping/bracing/orthotics, self management, education, directional preference exercise and home program  Decreased ROM/flexibility - manual therapy, therapeutic exercise, therapeutic activity and home program  Inflammation - cold therapy, US, electric stimulation and self management/home program  Decreased function - therapeutic activities and home program                            Re: Jill Fofana   :   1955        Therapy Evaluation Codes:   1) History comprised of:   Personal factors that impact the plan of care:      Time since onset of symptoms.    Comorbidity factors that impact the plan of care are:      Asthma and thyroid.     Medications impacting care: thyroid.  2) Examination of Body Systems comprised of:   Body structures and functions that impact the plan of care:      Sacral illiac joint.   Activity limitations  that impact the plan of care are:      Walking and transfers.  3) Clinical presentation characteristics are:   Stable/Uncomplicated.  4) Decision-Making    Low complexity using standardized patient assessment instrument and/or measureable assessment of functional outcome.  Cumulative Therapy Evaluation is: Low complexity.  Previous and current functional limitations:  (See Goal Flow Sheet for this information)    Short term and Long term goals: (See Goal Flow Sheet for this information)   Communication ability:  Patient appears to be able to clearly communicate and understand verbal and written communication and follow directions correctly.  Treatment Explanation - The following has been discussed with the patient:   RX ordered/plan of care  Anticipated outcomes  Possible risks and side effects  This patient would benefit from PT intervention to resume normal activities.   Rehab potential is good.  Frequency:  1 X week, once daily  Duration:  for 6 weeks  Discharge Plan:  Achieve all LTG.  Independent in home treatment program.  Reach maximal therapeutic benefit.    Thank you for your referral.    INQUIRIES  Therapist: Joyce High, PT   [facility]  [address] [place] [address]  [address]  [facility] [address]  Phone: [phone]  Fax: [phone]

## 2018-11-20 NOTE — PROGRESS NOTES
[place] [facility] Initial Evaluation  Subjective:  Patient is a 63 year old female presenting with rehab back hpi.   Jill Fofana is a 63 year old female with a lumbar condition.  Condition occurred with:  Twisting.  Condition occurred: during recreation/sport.  This is a new condition  MD order 11/12/18. Jill was stretching while doing yoga and bruised her back. She had increased pain after 10 min and she had increased pain the following day and pretty much standing at work. She met with her PCP and was given muscle relaxants. She was then referred to PT for further management. .    Patient reports pain:  Lumbar spine left and lower lumbar spine.  Radiates to: left groin and lower abdomen.  Pain is described as aching and is intermittent and reported as 2/10 and 4/10.   Pain is worse during the day.  Symptoms are exacerbated by twisting (transfers) and relieved by NSAID's, ice and muscle relaxants.  Since onset symptoms are gradually improving.        General health as reported by patient is excellent.  Pertinent medical history includes:  Asthma and thyroid problems.  Medical allergies: yes (wool, mushroom).  Other surgeries include:  Other and orthopedic surgery (right knee, thyroid).  Current medications:  Thyroid medication.  Current occupation is   .    Primary job tasks include:  Prolonged sitting and prolonged standing.                                Objective:  System         Lumbar/SI Evaluation  ROM:    AROM Lumbar:   Flexion:            WFL no pain  Ext:                    WFL no pain   Side Bend:        Left:  WFL with end range discomfort    Right:  WFL with end range discomfort  Rotation:           Left:  WFL with end range discomfort    Right:  WFL  no pain  Side Glide:        Left:     Right:           Lumbar Myotomes:  normal            Lumbar DTR's:  normal      Cord Signs:  normal    Lumbar Dermtomes:  normal                Neural Tension/Mobility:   Lumbar:  Normal        Lumbar Palpation:      Tenderness present at Right: Erector Spinae; Piriformis and PSIS        SI joint/Sacrum:        Left positive at:    Forward bend standing; Forward bend seated; Squish and Sacral thrust    Sacral conclusion left:  Locked                                               General     ROS    Assessment/Plan:    Patient is a 63 year old female with sacral complaints.    Patient has the following significant findings with corresponding treatment plan.                Diagnosis 1:  Left SI joint strain  Pain -  hot/cold therapy, US, electric stimulation, manual therapy, STS, splint/taping/bracing/orthotics, self management, education, directional preference exercise and home program  Decreased ROM/flexibility - manual therapy, therapeutic exercise, therapeutic activity and home program  Inflammation - cold therapy, US, electric stimulation and self management/home program  Decreased function - therapeutic activities and home program    Therapy Evaluation Codes:   1) History comprised of:   Personal factors that impact the plan of care:      Time since onset of symptoms.    Comorbidity factors that impact the plan of care are:      Asthma and thyroid.     Medications impacting care: thyroid.  2) Examination of Body Systems comprised of:   Body structures and functions that impact the plan of care:      Sacral illiac joint.   Activity limitations that impact the plan of care are:      Walking and transfers.  3) Clinical presentation characteristics are:   Stable/Uncomplicated.  4) Decision-Making    Low complexity using standardized patient assessment instrument and/or measureable assessment of functional outcome.  Cumulative Therapy Evaluation is: Low complexity.    Previous and current functional limitations:  (See Goal Flow Sheet for this information)    Short term and Long term goals: (See Goal Flow Sheet for this information)     Communication ability:  Patient appears to be  able to clearly communicate and understand verbal and written communication and follow directions correctly.  Treatment Explanation - The following has been discussed with the patient:   RX ordered/plan of care  Anticipated outcomes  Possible risks and side effects  This patient would benefit from PT intervention to resume normal activities.   Rehab potential is good.    Frequency:  1 X week, once daily  Duration:  for 6 weeks  Discharge Plan:  Achieve all LTG.  Independent in home treatment program.  Reach maximal therapeutic benefit.    Please refer to the daily flowsheet for treatment today, total treatment time and time spent performing 1:1 timed codes.

## 2018-11-27 ENCOUNTER — THERAPY VISIT (OUTPATIENT)
Dept: PHYSICAL THERAPY | Facility: CLINIC | Age: 63
End: 2018-11-27
Payer: COMMERCIAL

## 2018-11-27 DIAGNOSIS — M54.50 ACUTE LEFT-SIDED LOW BACK PAIN WITHOUT SCIATICA: ICD-10-CM

## 2018-11-27 DIAGNOSIS — M53.3 SACROILIAC JOINT PAIN: ICD-10-CM

## 2018-11-27 PROCEDURE — 97110 THERAPEUTIC EXERCISES: CPT | Mod: GP | Performed by: PHYSICAL THERAPIST

## 2018-12-04 ENCOUNTER — THERAPY VISIT (OUTPATIENT)
Dept: PHYSICAL THERAPY | Facility: CLINIC | Age: 63
End: 2018-12-04
Payer: COMMERCIAL

## 2018-12-04 DIAGNOSIS — M54.50 ACUTE LEFT-SIDED LOW BACK PAIN WITHOUT SCIATICA: ICD-10-CM

## 2018-12-04 DIAGNOSIS — M53.3 SACROILIAC JOINT PAIN: ICD-10-CM

## 2018-12-04 PROCEDURE — 97110 THERAPEUTIC EXERCISES: CPT | Mod: GP | Performed by: PHYSICAL THERAPIST

## 2018-12-04 NOTE — MR AVS SNAPSHOT
After Visit Summary   12/4/2018    Jill Fofana    MRN: [id number]           Patient Information     Date Of Birth          1955        Visit Information        Provider Department      12/4/2018 2:10 PM Vick George PT [place] [facility]        Today's Diagnoses     Acute left-sided low back pain without sciatica        Sacroiliac joint pain           Follow-ups after your visit        Who to contact     If you have questions or need follow up information about today's clinic visit or your schedule please contact [place] [facility] PHYSICAL THERAP[facility] directly at [phone].  Normal or non-critical lab and imaging results will be communicated to you by [employer], letter or phone within 4 business days after the clinic has received the results. If you do not hear from us within 7 days, please contact the clinic through [employer] or phone. If you have a critical or abnormal lab result, we will notify you by phone as soon as possible.  Submit refill requests through RadioFrame or call your pharmacy and they will forward the refill request to us. Please allow 3 business days for your refill to be completed.          Additional Information About Your Visit        MyChart Information     Radio[employer] gives you secure access to your electronic health record. If you see a primary care provider, you can also send messages to your care team and make appointments. If you have questions, please call your primary care clinic.  If you do not have a primary care provider, please call [phone] and they will assist you.        Care EveryWhere ID     This is your Care EveryWhere ID. This could be used by other organizations to access your [place] medical records  [id number]         Blood Pressure from Last 3 Encounters:   11/12/18 133/84   07/06/18 129/89   03/08/17 122/81    Weight from Last 3 Encounters:   11/12/18 78 kg (172 lb)    07/06/18 77.3 kg (170 lb 6.4 oz)   03/08/17 76.9 kg (169 lb 8 oz)              We Performed the Following     THERAPEUTIC EXERCISES        Primary Care Provider Office Phone # Fax #    Roslynteja Anderson [phone]       [address] [facility] [address]  [facility] [address]        Equal Access to Services     [facility]: Hadii aad ku hadasho Soomaali, waaxda luqadaha, qaybta kaalmada adeegyada, waxay idiin hayaan adeeg kharash la'aan . So [facility] [phone].    ATENCIÓN: Si habla español, tiene a monzon disposición servicios gratuitos de asistencia lingüística. Manuel al [phone].    We comply with applicable federal civil rights laws and [place] laws. We do not discriminate on the basis of race, color, national origin, age, disability, sex, sexual orientation, or gender identity.            Thank you!     Thank you for [facility] PHYSICAL THERAPY  for your care. Our goal is always to provide you with excellent care. Hearing back from our patients is one way we can continue to improve our services. Please take a few minutes to complete the written survey that you may receive in the mail after your visit with us. Thank you!             Your Updated Medication List - Protect others around you: Learn how to safely use, store and throw away your medicines at www.disposemymeds.org.          This list is accurate as of 12/4/18 11:59 PM.  Always use your most recent med list.                   Brand Name Dispense Instructions for use Diagnosis    acetaminophen 325 MG tablet    TYLENOL    100 tablet    Take 2 tablets (650 mg) by mouth every 4 hours as needed for mild pain    Acute left-sided low back pain without sciatica       albuterol 108 (90 Base) MCG/ACT inhaler    PROAIR HFA/PROVENTIL HFA/VENTOLIN HFA    3 Inhaler    Inhale 2 puffs into the lungs every 6 hours as needed for shortness of breath / dyspnea    Mild intermittent asthma without complication        cyclobenzaprine 10 MG tablet    FLEXERIL    30 tablet    Take 0.5-1 tablets (5-10 mg) by mouth 3 times daily as needed for muscle spasms    Acute left-sided low back pain without sciatica       levothyroxine 75 MCG tablet    SYNTHROID/LEVOTHROID    90 tablet    Take 1 tablet (75 mcg) by mouth daily    Acquired hypothyroidism       Menthol (Topical Analgesic) 4 % Gel    BIOFREEZE    1 Tube    Externally apply topically 3 times daily as needed (Pain)    Acute left-sided low back pain without sciatica       naproxen 500 MG tablet    NAPROSYN    30 tablet    Take 1 tablet (500 mg) by mouth 2 times daily as needed for moderate pain    Acute left-sided low back pain without sciatica

## 2019-06-03 ENCOUNTER — ANCILLARY PROCEDURE (OUTPATIENT)
Dept: MAMMOGRAPHY | Facility: CLINIC | Age: 64
End: 2019-06-03
Attending: OBSTETRICS & GYNECOLOGY
Payer: COMMERCIAL

## 2019-06-03 DIAGNOSIS — Z12.31 VISIT FOR SCREENING MAMMOGRAM: ICD-10-CM

## 2019-06-13 ENCOUNTER — OFFICE VISIT (OUTPATIENT)
Dept: OPHTHALMOLOGY | Facility: CLINIC | Age: 64
End: 2019-06-13
Payer: COMMERCIAL

## 2019-06-13 DIAGNOSIS — H25.13 NUCLEAR AGE-RELATED CATARACT, BOTH EYES: ICD-10-CM

## 2019-06-13 DIAGNOSIS — H40.039 ANATOMICAL NARROW ANGLE: ICD-10-CM

## 2019-06-13 DIAGNOSIS — H52.4 HYPEROPIA OF BOTH EYES WITH ASTIGMATISM AND PRESBYOPIA: Primary | ICD-10-CM

## 2019-06-13 DIAGNOSIS — H52.203 HYPEROPIA OF BOTH EYES WITH ASTIGMATISM AND PRESBYOPIA: Primary | ICD-10-CM

## 2019-06-13 DIAGNOSIS — H52.03 HYPEROPIA OF BOTH EYES WITH ASTIGMATISM AND PRESBYOPIA: Primary | ICD-10-CM

## 2019-06-13 ASSESSMENT — REFRACTION_CURRENTRX
OD_DIAMETER: 14.4
OD_ADDL_SPECS: D
OD_BRAND: PROCLEAR MULTIFOCAL
OD_SPHERE: +3.25
OS_ADD: +2.50
OS_AXIS: 160
OS_DIAMETER: 14.4
OS_ADDL_SPECS: N
OS_BRAND: PROCLEAR MULTIFOCAL TORIC
OS_BASECURVE: 8.8
OS_SPHERE: +3.75
OD_ADD: +2.50
OS_CYLINDER: -0.75
OD_BASECURVE: 8.7

## 2019-06-13 ASSESSMENT — REFRACTION_MANIFEST
OD_CYLINDER: +0.50
OS_CYLINDER: +1.00
OS_SPHERE: +2.25
OD_AXIS: 115
OD_SPHERE: +2.50
OS_AXIS: 075

## 2019-06-13 ASSESSMENT — REFRACTION_WEARINGRX
OD_CYLINDER: +0.50
OD_SPHERE: +2.50
OS_CYLINDER: +1.00
OS_AXIS: 075
OD_AXIS: 110
OS_ADD: +2.50
OD_ADD: +2.50
OS_SPHERE: +2.50

## 2019-06-13 ASSESSMENT — CUP TO DISC RATIO
OD_RATIO: 0.30
OS_RATIO: 0.30

## 2019-06-13 ASSESSMENT — EXTERNAL EXAM - RIGHT EYE: OD_EXAM: NORMAL

## 2019-06-13 ASSESSMENT — TONOMETRY
OS_IOP_MMHG: 15
OD_IOP_MMHG: 18
IOP_METHOD: ICARE

## 2019-06-13 ASSESSMENT — VISUAL ACUITY
OS_CC: 20/50
OD_CC: J2
OS_CC: J2
OS_CC+: -2
METHOD: SNELLEN - LINEAR
CORRECTION_TYPE: GLASSES
OD_CC: 20/20

## 2019-06-13 ASSESSMENT — CONF VISUAL FIELD
OD_NORMAL: 1
OS_NORMAL: 1

## 2019-06-13 ASSESSMENT — SLIT LAMP EXAM - LIDS
COMMENTS: 1+ MGD
COMMENTS: 1+ MGD

## 2019-06-13 ASSESSMENT — EXTERNAL EXAM - LEFT EYE: OS_EXAM: NORMAL

## 2019-06-13 NOTE — PROGRESS NOTES
A/P  1.) Hyperopia/Presbyopia each eye  -Fairly stable spec Rx, updated today  -Doing well in Proclear MF lenses  -Left lens has small tear that is causing corneal stain. Throw away lens, AT for 2 days and can use new lens    2.) Anatomical narrow angle OU  -Monitor, reviewed with pt  -IOP well controlled, reviewed signs of angle closure    3.) Cataracts each eye  -Not visually significant at this time    Monitor 1-2 years routine, sooner prn    I have confirmed the patient's CC, HPI and reviewed Past Medical History, Past Surgical History, Social History, Family History, Problem List, Medication List and agree with Tech note.     Joann Dave, OD FAAO FSLS

## 2019-06-13 NOTE — NURSING NOTE
Chief Complaints and History of Present Illnesses   Patient presents with     COMPREHENSIVE EYE EXAM     Chief Complaint(s) and History of Present Illness(es)     COMPREHENSIVE EYE EXAM     Laterality: both eyes    Quality: blurred vision    Associated symptoms: tearing and photophobia.  Negative for eye pain, flashes and floaters    Treatments tried: no treatments              Comments     Patient notes that the LE has been tearing x 2 days, feels that she has seasonal allergies, denies pain, flashes, floaters, or dryness, wasn't wearing CTL since Tuesday as she feels that she scratched her LE, notes that she put them in today for the exam.     Maya Watson June 13, 2019 7:55 AM

## 2019-08-21 ENCOUNTER — OFFICE VISIT (OUTPATIENT)
Dept: OBGYN | Facility: CLINIC | Age: 64
End: 2019-08-21
Attending: OBSTETRICS & GYNECOLOGY
Payer: COMMERCIAL

## 2019-08-21 ENCOUNTER — APPOINTMENT (OUTPATIENT)
Dept: LAB | Facility: CLINIC | Age: 64
End: 2019-08-21
Payer: COMMERCIAL

## 2019-08-21 VITALS — HEIGHT: 62 IN | BODY MASS INDEX: 31.89 KG/M2 | WEIGHT: 173.3 LBS

## 2019-08-21 DIAGNOSIS — Z00.00 VISIT FOR PREVENTIVE HEALTH EXAMINATION: ICD-10-CM

## 2019-08-21 DIAGNOSIS — E03.9 ACQUIRED HYPOTHYROIDISM: Primary | ICD-10-CM

## 2019-08-21 LAB — TSH SERPL DL<=0.005 MIU/L-ACNC: 2.6 MU/L (ref 0.4–4)

## 2019-08-21 PROCEDURE — G0463 HOSPITAL OUTPT CLINIC VISIT: HCPCS

## 2019-08-21 PROCEDURE — 36415 COLL VENOUS BLD VENIPUNCTURE: CPT | Performed by: OBSTETRICS & GYNECOLOGY

## 2019-08-21 PROCEDURE — 84443 ASSAY THYROID STIM HORMONE: CPT | Performed by: OBSTETRICS & GYNECOLOGY

## 2019-08-21 RX ORDER — LEVOTHYROXINE SODIUM 75 UG/1
75 TABLET ORAL DAILY
Qty: 90 TABLET | Refills: 3 | Status: SHIPPED | OUTPATIENT
Start: 2019-08-21 | End: 2019-08-21

## 2019-08-21 RX ORDER — LEVOTHYROXINE SODIUM 75 UG/1
75 TABLET ORAL DAILY
Qty: 90 TABLET | Refills: 3 | Status: SHIPPED | OUTPATIENT
Start: 2019-08-21 | End: 2020-07-07

## 2019-08-21 ASSESSMENT — MIFFLIN-ST. JEOR: SCORE: 1305.68

## 2019-08-21 ASSESSMENT — ENCOUNTER SYMPTOMS
RECTAL PAIN: 0
HEARTBURN: 0
BLOATING: 0
VOMITING: 0
SMELL DISTURBANCE: 0
SINUS CONGESTION: 1
NAUSEA: 0
CONSTIPATION: 1
TASTE DISTURBANCE: 0
BOWEL INCONTINENCE: 0
ABDOMINAL PAIN: 0
HOARSE VOICE: 1
DIARRHEA: 0
SINUS PAIN: 1
JAUNDICE: 0
TROUBLE SWALLOWING: 0
SORE THROAT: 1
NECK MASS: 0
BLOOD IN STOOL: 0

## 2019-08-21 ASSESSMENT — ANXIETY QUESTIONNAIRES
3. WORRYING TOO MUCH ABOUT DIFFERENT THINGS: NOT AT ALL
7. FEELING AFRAID AS IF SOMETHING AWFUL MIGHT HAPPEN: NOT AT ALL
5. BEING SO RESTLESS THAT IT IS HARD TO SIT STILL: NOT AT ALL
4. TROUBLE RELAXING: NOT AT ALL
GAD7 TOTAL SCORE: 0
3. WORRYING TOO MUCH ABOUT DIFFERENT THINGS: NOT AT ALL
2. NOT BEING ABLE TO STOP OR CONTROL WORRYING: NOT AT ALL
5. BEING SO RESTLESS THAT IT IS HARD TO SIT STILL: NOT AT ALL
GAD7 TOTAL SCORE: 0
7. FEELING AFRAID AS IF SOMETHING AWFUL MIGHT HAPPEN: NOT AT ALL
7. FEELING AFRAID AS IF SOMETHING AWFUL MIGHT HAPPEN: NOT AT ALL
2. NOT BEING ABLE TO STOP OR CONTROL WORRYING: NOT AT ALL
GAD7 TOTAL SCORE: 0
6. BECOMING EASILY ANNOYED OR IRRITABLE: NOT AT ALL
1. FEELING NERVOUS, ANXIOUS, OR ON EDGE: NOT AT ALL
1. FEELING NERVOUS, ANXIOUS, OR ON EDGE: NOT AT ALL
6. BECOMING EASILY ANNOYED OR IRRITABLE: NOT AT ALL

## 2019-08-21 ASSESSMENT — PATIENT HEALTH QUESTIONNAIRE - PHQ9
5. POOR APPETITE OR OVEREATING: NOT AT ALL
SUM OF ALL RESPONSES TO PHQ QUESTIONS 1-9: 0

## 2019-08-21 NOTE — PROGRESS NOTES
Progress Note    SUBJECTIVE:  Jill Fofana is an 64 year old, , who requests an Annual Preventive Exam.       Concerns today include: none. Doing well. Traveling to [place] next year. Nephew accepted into medical school at [employer].    Menstrual History:  Menstrual History 11/15/2013   Reviewed Today Yes   Comments Menopause       Last    Lab Results   Component Value Date    PAP NIL 2018     History of abnormal Pap smear: NO - age 30-65 PAP every 5 years with negative HPV co-testing recommended    Last   Lab Results   Component Value Date    HPV16 Negative 2018     Last   Lab Results   Component Value Date    HPV18 Negative 2018     Last   Lab Results   Component Value Date    HRHPV Negative 2018       Mammogram current: yes  Last Mammogram:   Ma Screening Digital Bilateral    Result Date: 2019  Narrative: Examination: Bilateral digital screening mammography with computer aided detection. Comparison: 2018, 2017, 2015, 2014 History/Family history: No symptoms, routine screening. BREAST DENSITY: Scattered fibroglandular densities. COMMENTS: There has been no significant change.      Ma Screening Digital Bilateral    Result Date: 2018  Narrative: Examination: Bilateral digital screening mammography with computer aided detection History: No symptoms, routine screening. No family history of breast cancer. Comparison: Mammogram dated 2017, 2015, 2014, and 2013. BREAST DENSITY: Scattered fibroglandular densities. Findings: No significant change.     Ma Screening Digital Bilateral    Result Date: 2017  Narrative: SCREENING MAMMOGRAM, BILATERAL, DIGITAL, with CAD HISTORY: No current breast complaints. COMPARISON: 2015, 2014, 2013, 2012 BREAST DENSITY: Scattered fibroglandular densities. No significant change.     Ma Screening Digital Bilateral    Result Date: 2015  Narrative: SCREENING MAMMOGRAM,  BILATERAL, DIGITAL w/CAD HISTORY: No current breast symptoms. COMPARISON:  2014, 2013, and 2012 BREAST DENSITY: Scattered fibroglandular densities. No significant change.        Last Colonoscopy:  No results found for this or any previous visit.      HISTORY:    Current Outpatient Medications on File Prior to Visit:  acetaminophen (TYLENOL) 325 MG tablet Take 2 tablets (650 mg) by mouth every 4 hours as needed for mild pain   albuterol (PROAIR HFA/PROVENTIL HFA/VENTOLIN HFA) 108 (90 Base) MCG/ACT Inhaler Inhale 2 puffs into the lungs every 6 hours as needed for shortness of breath / dyspnea   albuterol (PROVENTIL HFA: VENTOLIN HFA) 108 (90 BASE) MCG/ACT inhaler Inhale 2 puffs into the lungs every 6 hours as needed for shortness of breath / dyspnea for 30 days.   cyclobenzaprine (FLEXERIL) 10 MG tablet Take 0.5-1 tablets (5-10 mg) by mouth 3 times daily as needed for muscle spasms   levothyroxine (SYNTHROID/LEVOTHROID) 75 MCG tablet Take 1 tablet (75 mcg) by mouth daily   Menthol, Topical Analgesic, (BIOFREEZE) 4 % GEL Externally apply topically 3 times daily as needed (Pain)   naproxen (NAPROSYN) 500 MG tablet Take 1 tablet (500 mg) by mouth 2 times daily as needed for moderate pain     No current facility-administered medications on file prior to visit.   Allergies   Allergen Reactions     Mushrooms [Mushroom]      Wool Fiber      Immunization History   Administered Date(s) Administered     TDAP Vaccine (Boostrix) 2013       OB History    Para Term  AB Living   2 0 0 0 0 2   SAB TAB Ectopic Multiple Live Births   0 0 0 0 0     Past Medical History:   Diagnosis Date     Anemia      Nonsenile cataract      Thyroid disease      Uncomplicated asthma      Past Surgical History:   Procedure Laterality Date     APPENDECTOMY       COLONOSCOPY  ?     ORTHOPEDIC SURGERY  2016     STRIP VEIN       THYROIDECTOMY       Family History   Problem Relation Age of Onset      Cancer Father      Other Cancer Father      Retinal detachment Maternal Grandfather         2' to fall     Asthma Mother      Nystagmus No family hx of      Glaucoma No family hx of      Macular Degeneration No family hx of      Social History     Socioeconomic History     Marital status:      Spouse name: None     Number of children: None     Years of education: None     Highest education level: None   Occupational History     Occupation:      Employer: [employer]   Social Needs     Financial resource strain: None     Food insecurity:     Worry: None     Inability: None     Transportation needs:     Medical: None     Non-medical: None   Tobacco Use     Smoking status: Former Smoker     Packs/day: 0.00     Years: 0.00     Pack years: 0.00     Smokeless tobacco: Never Used   Substance and Sexual Activity     Alcohol use: Not Currently     Alcohol/week: 0.5 - 1.0 oz     Comment: occ.     Drug use: No     Sexual activity: Never   Lifestyle     Physical activity:     Days per week: None     Minutes per session: None     Stress: None   Relationships     Social connections:     Talks on phone: None     Gets together: None     Attends Restoration service: None     Active member of club or organization: None     Attends meetings of clubs or organizations: None     Relationship status: None     Intimate partner violence:     Fear of current or ex partner: None     Emotionally abused: None     Physically abused: None     Forced sexual activity: None   Other Topics Concern     Parent/sibling w/ CABG, MI or angioplasty before 65F 55M? Not Asked      Service No     Blood Transfusions No     Caffeine Concern No     Occupational Exposure Yes     Comment: travel     Hobby Hazards No     Sleep Concern No     Stress Concern No     Weight Concern No     Special Diet No     Back Care No     Exercise Yes     Bike Helmet Yes     Seat Belt Yes     Self-Exams Yes   Social History Narrative    How much exercise per  "week? 6 days a week    How much calcium per day? Supplement, Diet       How much caffeine per day? Tea (2 cup)    How much vitamin D per day? Supplement, Diet    Do you/your family wear seatbelts?  Yes    Do you/your family use safety helmets? No    Do you/your family use sunscreen? Yes    Do you/your family keep firearms in the home? No    Do you/your family have a smoke detector(s)? Yes        Do you feel safe in your home? Yes    Has anyone ever touched you in an unwanted manner? 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.     Lots of leadership changes, working very hard.     Both children graduate this year.     Roslyn Anderson MD        7/6/18    Struggling with political climate but otherwise well. Daughter  last month. Son is atty in [employer] [facility].     Roslyn Anderson                       ROS  [unfilled]  PHQ-9 SCORE 8/21/2019   PHQ-9 Total Score 0     SUSANA-7 SCORE 11/12/2018 8/21/2019 8/21/2019   Total Score - - 0 (minimal anxiety)   Total Score 0 0 0         EXAM:  Height 1.585 m (5' 2.4\"), weight 78.6 kg (173 lb 4.8 oz). Body mass index is 31.29 kg/m .  General - pleasant female in no acute distress.  Skin - no suspicious lesions or rashes  EENT-  PERRLA, euthyroid with out palpable nodules  Neck - supple without lymphadenopathy.  Lungs - clear to auscultation bilaterally.  Heart - regular rate and rhythm without murmur.  Abdomen - soft, nontender, nondistended, no masses or organomegaly noted.  Musculoskeletal - no gross deformities.  Neurological - normal strength, sensation, and mental status.    Breast Exam:  Breast: Without visible skin changes. No dimpling or lesions seen.   Breasts supple, non-tender with palpation, no dominant mass, nodularity, or nipple discharge noted bilaterally. Axillary nodes negative.      Pelvic Exam:  EG/BUS: Normal genital architecture without lesions, erythema or abnormal secretions Bartholin's, Urethra, [place]'s normal   Urethral meatus: normal "   Urethra: no masses, tenderness, or scarring   Bladder: no masses or tenderness   Vagina: moist, pink, rugae with creamy, white and odorless  secretions  Cervix: Multiparous, and no lesions  Uterus: anteverted,  and small, smooth, firm, mobile w/o pain  Adnexa: Within normal limits and No masses, nodularity, tenderness  Rectum:anus normal       ASSESSMENT:  Encounter Diagnosis   Name Primary?     Acquired hypothyroidism Yes        PLAN:   Orders Placed This Encounter   Procedures     TSH with free T4 reflex     Return to clinic in one year.  Follow-up as needed.    Roslyn Anderson MD          Answers for HPI/ROS submitted by the patient on 8/21/2019   SUSANA 7 TOTAL SCORE: 0

## 2019-08-21 NOTE — LETTER
2019       RE: Jill Fofana  [id number] [facility] [id number]     Dear Colleague,    Thank you for referring your patient, Jill Fofana, to the [facility] at [facility]. Please see a copy of my visit note below.      Progress Note    SUBJECTIVE:  Jill Fofana is an 64 year old, , who requests an Annual Preventive Exam.       Concerns today include: none. Doing well. Traveling to [place] next year. Nephew accepted into medical school at [employer].    Menstrual History:  Menstrual History 11/15/2013   Reviewed Today Yes   Comments Menopause     Last    Lab Results   Component Value Date    PAP NIL 2018     History of abnormal Pap smear: NO - age 30-65 PAP every 5 years with negative HPV co-testing recommended    Last   Lab Results   Component Value Date    HPV16 Negative 2018     Last   Lab Results   Component Value Date    HPV18 Negative 2018     Last   Lab Results   Component Value Date    HRHPV Negative 2018     Mammogram current: yes  Last Mammogram:   Ma Screening Digital Bilateral    Result Date: 2019  Narrative: Examination: Bilateral digital screening mammography with computer aided detection. Comparison: 2018, 2017, 2015, 2014 History/Family history: No symptoms, routine screening. BREAST DENSITY: Scattered fibroglandular densities. COMMENTS: There has been no significant change.      Ma Screening Digital Bilateral    Result Date: 2018  Narrative: Examination: Bilateral digital screening mammography with computer aided detection History: No symptoms, routine screening. No family history of breast cancer. Comparison: Mammogram dated 2017, 2015, 2014, and 2013. BREAST DENSITY: Scattered fibroglandular densities. Findings: No significant change.     Ma Screening Digital Bilateral    Result Date: 2017  Narrative:  SCREENING MAMMOGRAM, BILATERAL, DIGITAL, with CAD HISTORY: No current breast complaints. COMPARISON: 2015, 2014, 2013, 2012 BREAST DENSITY: Scattered fibroglandular densities. No significant change.     Ma Screening Digital Bilateral    Result Date: 2015  Narrative: SCREENING MAMMOGRAM, BILATERAL, DIGITAL w/CAD HISTORY: No current breast symptoms. COMPARISON:  2014, 2013, and 2012 BREAST DENSITY: Scattered fibroglandular densities. No significant change.      Last Colonoscopy:  No results found for this or any previous visit.    HISTORY:  Current Outpatient Medications on File Prior to Visit:  acetaminophen (TYLENOL) 325 MG tablet Take 2 tablets (650 mg) by mouth every 4 hours as needed for mild pain   albuterol (PROAIR HFA/PROVENTIL HFA/VENTOLIN HFA) 108 (90 Base) MCG/ACT Inhaler Inhale 2 puffs into the lungs every 6 hours as needed for shortness of breath / dyspnea   albuterol (PROVENTIL HFA: VENTOLIN HFA) 108 (90 BASE) MCG/ACT inhaler Inhale 2 puffs into the lungs every 6 hours as needed for shortness of breath / dyspnea for 30 days.   cyclobenzaprine (FLEXERIL) 10 MG tablet Take 0.5-1 tablets (5-10 mg) by mouth 3 times daily as needed for muscle spasms   levothyroxine (SYNTHROID/LEVOTHROID) 75 MCG tablet Take 1 tablet (75 mcg) by mouth daily   Menthol, Topical Analgesic, (BIOFREEZE) 4 % GEL Externally apply topically 3 times daily as needed (Pain)   naproxen (NAPROSYN) 500 MG tablet Take 1 tablet (500 mg) by mouth 2 times daily as needed for moderate pain     No current facility-administered medications on file prior to visit.   Allergies   Allergen Reactions     Mushrooms [Mushroom]      Wool Fiber      Immunization History   Administered Date(s) Administered     TDAP Vaccine (Boostrix) 2013       OB History    Para Term  AB Living   2 0 0 0 0 2   SAB TAB Ectopic Multiple Live Births   0 0 0 0 0     Past Medical History:   Diagnosis Date     Anemia  1990     Nonsenile cataract      Thyroid disease 1990     Uncomplicated asthma 1999     Past Surgical History:   Procedure Laterality Date     APPENDECTOMY       COLONOSCOPY  2007?     ORTHOPEDIC SURGERY  March 2016     STRIP VEIN       THYROIDECTOMY       Family History   Problem Relation Age of Onset     Cancer Father      Other Cancer Father      Retinal detachment Maternal Grandfather         2' to fall     Asthma Mother      Nystagmus No family hx of      Glaucoma No family hx of      Macular Degeneration No family hx of      Social History     Socioeconomic History     Marital status:      Spouse name: None     Number of children: None     Years of education: None     Highest education level: None   Occupational History     Occupation:      Employer: [employer]   Social Needs     Financial resource strain: None     Food insecurity:     Worry: None     Inability: None     Transportation needs:     Medical: None     Non-medical: None   Tobacco Use     Smoking status: Former Smoker     Packs/day: 0.00     Years: 0.00     Pack years: 0.00     Smokeless tobacco: Never Used   Substance and Sexual Activity     Alcohol use: Not Currently     Alcohol/week: 0.5 - 1.0 oz     Comment: occ.     Drug use: No     Sexual activity: Never   Lifestyle     Physical activity:     Days per week: None     Minutes per session: None     Stress: None   Relationships     Social connections:     Talks on phone: None     Gets together: None     Attends Hoahaoism service: None     Active member of club or organization: None     Attends meetings of clubs or organizations: None     Relationship status: None     Intimate partner violence:     Fear of current or ex partner: None     Emotionally abused: None     Physically abused: None     Forced sexual activity: None   Other Topics Concern     Parent/sibling w/ CABG, MI or angioplasty before 65F 55M? Not Asked      Service No     Blood Transfusions No     Caffeine  "Concern No     Occupational Exposure Yes     Comment: travel     Hobby Hazards No     Sleep Concern No     Stress Concern No     Weight Concern No     Special Diet No     Back Care No     Exercise Yes     Bike Helmet Yes     Seat Belt Yes     Self-Exams Yes   Social History Narrative    How much exercise per week? 6 days a week    How much calcium per day? Supplement, Diet       How much caffeine per day? Tea (2 cup)    How much vitamin D per day? Supplement, Diet    Do you/your family wear seatbelts?  Yes    Do you/your family use safety helmets? No    Do you/your family use sunscreen? Yes    Do you/your family keep firearms in the home? No    Do you/your family have a smoke detector(s)? Yes        Do you feel safe in your home? Yes    Has anyone ever touched you in an unwanted manner? 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     Lots of leadership changes, working very hard.     Both children graduate this year.     Roslyn Anderson MD        7/6/18    Struggling with political climate but otherwise well. Daughter  last month. Son is atty in [employer].     Roslyn Anderson                       ROS  [unfilled]  PHQ-9 SCORE 8/21/2019   PHQ-9 Total Score 0     SUSANA-7 SCORE 11/12/2018 8/21/2019 8/21/2019   Total Score - - 0 (minimal anxiety)   Total Score 0 0 0     EXAM:  Height 1.585 m (5' 2.4\"), weight 78.6 kg (173 lb 4.8 oz). Body mass index is 31.29 kg/m .  General - pleasant female in no acute distress.  Skin - no suspicious lesions or rashes  EENT-  PERRLA, euthyroid with out palpable nodules  Neck - supple without lymphadenopathy.  Lungs - clear to auscultation bilaterally.  Heart - regular rate and rhythm without murmur.  Abdomen - soft, nontender, nondistended, no masses or organomegaly noted.  Musculoskeletal - no gross deformities.  Neurological - normal strength, sensation, and mental status.    Breast Exam:  Breast: Without visible skin changes. No dimpling or lesions seen.   " Breasts supple, non-tender with palpation, no dominant mass, nodularity, or nipple discharge noted bilaterally. Axillary nodes negative.      Pelvic Exam:  EG/BUS: Normal genital architecture without lesions, erythema or abnormal secretions Bartholin's, Urethra, [place]'s normal   Urethral meatus: normal   Urethra: no masses, tenderness, or scarring   Bladder: no masses or tenderness   Vagina: moist, pink, rugae with creamy, white and odorless  secretions  Cervix: Multiparous, and no lesions  Uterus: anteverted,  and small, smooth, firm, mobile w/o pain  Adnexa: Within normal limits and No masses, nodularity, tenderness  Rectum:anus normal     ASSESSMENT:  Encounter Diagnosis   Name Primary?     Acquired hypothyroidism Yes      PLAN:   Orders Placed This Encounter   Procedures     TSH with free T4 reflex     Return to clinic in one year.  Follow-up as needed.    Roslyn Anderson MD    Answers for HPI/ROS submitted by the patient on 8/21/2019   SUSANA 7 TOTAL SCORE: 0

## 2019-08-21 NOTE — PATIENT INSTRUCTIONS

## 2019-08-22 ASSESSMENT — ANXIETY QUESTIONNAIRES: GAD7 TOTAL SCORE: 0

## 2019-08-30 NOTE — PROGRESS NOTES
Discharge Note    Progress reporting period is from last progress note on   to Dec 4, 2018.    Jill failed to follow up and current status is unknown.  Please see information below for last relevant information on current status.  Patient seen for   visits.    SUBJECTIVE  Subjective changes noted by patient:  Doing very well with no c/o LBP.  .  Current pain level is 0/10.     Previous pain level was   .   Changes in function:  Yes (See Goal flowsheet attached for changes in current functional level)  Adverse reaction to treatment or activity: None    OBJECTIVE  Changes noted in objective findings: SIJ alignment normal. Reveiwed HEP and added ball prayer and bridge.     ASSESSMENT/PLAN  Diagnosis: Left SI joint pain- strain   Updated problem list and treatment plan:   Pain - HEP  STG/LTGs have been met or progress has been made towards goals:  Yes, please see goal flowsheet for most current information  Assessment of Progress: current status is unknown.    Last current status: Pt is progressing well   Self Management Plans:  HEP  I have re-evaluated this patient and find that the nature, scope, duration and intensity of the therapy is appropriate for the medical condition of the patient.  Jill continues to require the following intervention to meet STG and LTG's:  HEP.    Recommendations:  Discharge with current home program.  Patient to follow up with MD as needed.    Please refer to the daily flowsheet for treatment today, total treatment time and time spent performing 1:1 timed codes.

## 2019-11-05 ENCOUNTER — HEALTH MAINTENANCE LETTER (OUTPATIENT)
Age: 64
End: 2019-11-05

## 2020-06-11 ENCOUNTER — ANCILLARY PROCEDURE (OUTPATIENT)
Dept: MAMMOGRAPHY | Facility: CLINIC | Age: 65
End: 2020-06-11
Attending: OBSTETRICS & GYNECOLOGY
Payer: COMMERCIAL

## 2020-06-11 DIAGNOSIS — Z12.31 VISIT FOR SCREENING MAMMOGRAM: ICD-10-CM

## 2020-06-26 ENCOUNTER — OFFICE VISIT (OUTPATIENT)
Dept: OPTOMETRY | Facility: CLINIC | Age: 65
End: 2020-06-26
Payer: COMMERCIAL

## 2020-06-26 DIAGNOSIS — H52.03 HYPEROPIA WITH ASTIGMATISM AND PRESBYOPIA, BILATERAL: Primary | ICD-10-CM

## 2020-06-26 DIAGNOSIS — H52.4 HYPEROPIA WITH ASTIGMATISM AND PRESBYOPIA, BILATERAL: Primary | ICD-10-CM

## 2020-06-26 DIAGNOSIS — H40.039 ANATOMICAL NARROW ANGLE: ICD-10-CM

## 2020-06-26 DIAGNOSIS — H25.13 NUCLEAR AGE-RELATED CATARACT, BOTH EYES: ICD-10-CM

## 2020-06-26 DIAGNOSIS — H52.203 HYPEROPIA WITH ASTIGMATISM AND PRESBYOPIA, BILATERAL: Primary | ICD-10-CM

## 2020-06-26 ASSESSMENT — REFRACTION_CURRENTRX
OS_SPHERE: +3.75
OS_BASECURVE: 8.6
OD_BRAND: PROCLEAR MULTIFOCAL
OD_DIAMETER: 14.0
OS_AXIS: 160
OD_DIAMETER: 14.4
OS_DIAMETER: 14.4
OS_ADD: +2.50
OD_ADD: +2.50
OD_BASECURVE: 8.6
OS_CYLINDER: -0.75
OD_SPHERE: +3.75
OS_BRAND: BIOFINITY MULTIFOCAL
OS_ADD: +2.50
OD_BASECURVE: 8.7
OS_BRAND: PROCLEAR MULTIFOCAL TORIC
OS_ADDL_SPECS: N
OD_ADDL_SPECS: D
OS_SPHERE: +3.50
OD_SPHERE: +3.25
OS_DIAMETER: 14.0
OD_BRAND: BIOFINITY MULTIFOCAL
OS_BASECURVE: 8.8
OD_ADD: +2.50

## 2020-06-26 ASSESSMENT — VISUAL ACUITY
OS_CC: 20/30
OD_CC: 20/30+1
OS_CC: 20/70
METHOD: SNELLEN - LINEAR
OD_CC: 20/30
CORRECTION_TYPE: CONTACTS

## 2020-06-26 ASSESSMENT — REFRACTION_MANIFEST
OS_AXIS: 080
OD_AXIS: 120
OD_CYLINDER: +0.50
OS_SPHERE: +3.00
OD_SPHERE: +3.00
OS_CYLINDER: +0.50

## 2020-06-26 ASSESSMENT — REFRACTION_WEARINGRX
OD_CYLINDER: +0.50
OD_ADD: +2.50
OD_SPHERE: +2.50
OS_CYLINDER: +1.00
OD_AXIS: 115
OS_SPHERE: +2.25
OS_AXIS: 075
OS_ADD: +2.50

## 2020-06-26 ASSESSMENT — CONF VISUAL FIELD
OD_NORMAL: 1
OS_NORMAL: 1

## 2020-06-26 ASSESSMENT — SLIT LAMP EXAM - LIDS
COMMENTS: 1+ MGD
COMMENTS: 1+ MGD

## 2020-06-26 ASSESSMENT — EXTERNAL EXAM - RIGHT EYE: OD_EXAM: NORMAL

## 2020-06-26 ASSESSMENT — TONOMETRY
OD_IOP_MMHG: 15
IOP_METHOD: ICARE
OS_IOP_MMHG: 16

## 2020-06-26 ASSESSMENT — CUP TO DISC RATIO
OS_RATIO: 0.30
OD_RATIO: 0.30

## 2020-06-26 ASSESSMENT — EXTERNAL EXAM - LEFT EYE: OS_EXAM: NORMAL

## 2020-06-26 NOTE — PROGRESS NOTES
A/P  1.) Hyperopia/Presbyopia each eye  -Increased plus in spec Rx, updated today  -Shadowing in left toric lens, can switch to spherical left eye MF, excellent vision with increased plus  -Improved comfort with BF vs proclear    2.) Anatomical narrow angle OU  -Monitor, reviewed with pt  -IOP well controlled    3.) Cataracts each eye  -Not visually significant at this time    Monitor 1 year dilated eye exam

## 2020-07-07 ENCOUNTER — TELEPHONE (OUTPATIENT)
Dept: OBGYN | Facility: CLINIC | Age: 65
End: 2020-07-07

## 2020-07-07 DIAGNOSIS — E03.9 ACQUIRED HYPOTHYROIDISM: ICD-10-CM

## 2020-07-07 RX ORDER — LEVOTHYROXINE SODIUM 75 UG/1
75 TABLET ORAL DAILY
Qty: 90 TABLET | Refills: 0 | Status: SHIPPED | OUTPATIENT
Start: 2020-07-07 | End: 2020-09-28

## 2020-07-07 NOTE — TELEPHONE ENCOUNTER
Received refill request for levothyroxine.  Last in clinic August 2019 for annual.  Discussed with patient that she is due for annual come August. She understands. Short-term refill of levothyroxine sent to pharmacy.

## 2020-09-28 DIAGNOSIS — E03.9 ACQUIRED HYPOTHYROIDISM: ICD-10-CM

## 2020-09-28 RX ORDER — LEVOTHYROXINE SODIUM 75 UG/1
75 TABLET ORAL DAILY
Qty: 30 TABLET | Refills: 0 | Status: SHIPPED | OUTPATIENT
Start: 2020-09-28 | End: 2020-09-28

## 2020-09-28 RX ORDER — LEVOTHYROXINE SODIUM 75 UG/1
75 TABLET ORAL DAILY
Qty: 90 TABLET | Refills: 0 | Status: SHIPPED | OUTPATIENT
Start: 2020-09-28

## 2020-09-28 NOTE — TELEPHONE ENCOUNTER
Received refill request for levothyroxine.  Last in clinic 8/2019.      Was given a short term refill in 7/2020 with a reminder that she needed to schedule an annual exam.  She has not done so.    Left message for Jill, reminding her that she needs to call [phone] to schedule, and request routed to Dr Anderson.

## 2020-09-28 NOTE — TELEPHONE ENCOUNTER
Patient returned call. She has moved from the area and has not yet established care. Advised will refill for 90-day supply while patient works to establish care. Patient understands that no further refills will be provided.

## 2020-11-22 ENCOUNTER — HEALTH MAINTENANCE LETTER (OUTPATIENT)
Age: 65
End: 2020-11-22

## 2021-09-19 ENCOUNTER — HEALTH MAINTENANCE LETTER (OUTPATIENT)
Age: 66
End: 2021-09-19

## 2022-01-09 ENCOUNTER — HEALTH MAINTENANCE LETTER (OUTPATIENT)
Age: 67
End: 2022-01-09

## 2022-08-21 ENCOUNTER — HEALTH MAINTENANCE LETTER (OUTPATIENT)
Age: 67
End: 2022-08-21

## 2022-11-21 ENCOUNTER — HEALTH MAINTENANCE LETTER (OUTPATIENT)
Age: 67
End: 2022-11-21

## 2023-09-16 ENCOUNTER — HEALTH MAINTENANCE LETTER (OUTPATIENT)
Age: 68
End: 2023-09-16